# Patient Record
Sex: FEMALE | Race: WHITE | Employment: OTHER | URBAN - METROPOLITAN AREA
[De-identification: names, ages, dates, MRNs, and addresses within clinical notes are randomized per-mention and may not be internally consistent; named-entity substitution may affect disease eponyms.]

---

## 2019-08-29 ENCOUNTER — OFFICE VISIT (OUTPATIENT)
Dept: CARDIOLOGY CLINIC | Age: 84
End: 2019-08-29
Payer: MEDICARE

## 2019-08-29 VITALS
DIASTOLIC BLOOD PRESSURE: 70 MMHG | WEIGHT: 162 LBS | HEART RATE: 78 BPM | HEIGHT: 60 IN | BODY MASS INDEX: 31.8 KG/M2 | SYSTOLIC BLOOD PRESSURE: 138 MMHG

## 2019-08-29 DIAGNOSIS — R60.0 LOCALIZED EDEMA: ICD-10-CM

## 2019-08-29 DIAGNOSIS — I10 HYPERTENSION, UNSPECIFIED TYPE: Primary | ICD-10-CM

## 2019-08-29 PROCEDURE — G8400 PT W/DXA NO RESULTS DOC: HCPCS | Performed by: INTERNAL MEDICINE

## 2019-08-29 PROCEDURE — 4040F PNEUMOC VAC/ADMIN/RCVD: CPT | Performed by: INTERNAL MEDICINE

## 2019-08-29 PROCEDURE — 1090F PRES/ABSN URINE INCON ASSESS: CPT | Performed by: INTERNAL MEDICINE

## 2019-08-29 PROCEDURE — 93000 ELECTROCARDIOGRAM COMPLETE: CPT | Performed by: INTERNAL MEDICINE

## 2019-08-29 PROCEDURE — 99203 OFFICE O/P NEW LOW 30 MIN: CPT | Performed by: INTERNAL MEDICINE

## 2019-08-29 PROCEDURE — 1123F ACP DISCUSS/DSCN MKR DOCD: CPT | Performed by: INTERNAL MEDICINE

## 2019-08-29 PROCEDURE — G8417 CALC BMI ABV UP PARAM F/U: HCPCS | Performed by: INTERNAL MEDICINE

## 2019-08-29 PROCEDURE — 1036F TOBACCO NON-USER: CPT | Performed by: INTERNAL MEDICINE

## 2019-08-29 PROCEDURE — G8427 DOCREV CUR MEDS BY ELIG CLIN: HCPCS | Performed by: INTERNAL MEDICINE

## 2019-08-29 RX ORDER — IBUPROFEN 800 MG/1
800 TABLET ORAL EVERY 6 HOURS PRN
COMMUNITY
End: 2019-10-18

## 2019-08-29 RX ORDER — TORSEMIDE 100 MG/1
TABLET ORAL
COMMUNITY
Start: 2019-08-27 | End: 2019-10-11 | Stop reason: ALTCHOICE

## 2019-08-29 RX ORDER — AMLODIPINE BESYLATE 10 MG/1
10 TABLET ORAL
COMMUNITY
End: 2019-08-29

## 2019-08-29 RX ORDER — LOSARTAN POTASSIUM 100 MG/1
100 TABLET ORAL DAILY
Qty: 30 TABLET | Refills: 5 | Status: SHIPPED | OUTPATIENT
Start: 2019-08-29 | End: 2019-10-18 | Stop reason: SINTOL

## 2019-08-29 RX ORDER — SPIRONOLACTONE 25 MG/1
25 TABLET ORAL DAILY
Qty: 30 TABLET | Refills: 5 | Status: SHIPPED | OUTPATIENT
Start: 2019-08-29 | End: 2019-10-11 | Stop reason: ALTCHOICE

## 2019-08-29 NOTE — LETTER
 torsemide (DEMADEX) 100 MG tablet TAKE 1/2 TABLET BY MOUTH EVERY DAY      ibuprofen (ADVIL;MOTRIN) 800 MG tablet Take 800 mg by mouth every 6 hours as needed for Pain      levothyroxine (SYNTHROID) 88 MCG tablet Take 88 mcg by mouth daily. No current facility-administered medications for this visit. Allergies:  Patient has no known allergies. Social History:  Social History     Socioeconomic History    Marital status:      Spouse name: Not on file    Number of children: Not on file    Years of education: Not on file    Highest education level: Not on file   Occupational History    Not on file   Social Needs    Financial resource strain: Not on file    Food insecurity:     Worry: Not on file     Inability: Not on file    Transportation needs:     Medical: Not on file     Non-medical: Not on file   Tobacco Use    Smoking status: Passive Smoke Exposure - Never Smoker    Smokeless tobacco: Never Used   Substance and Sexual Activity    Alcohol use: No    Drug use: Not on file    Sexual activity: Not on file   Lifestyle    Physical activity:     Days per week: Not on file     Minutes per session: Not on file    Stress: Not on file   Relationships    Social connections:     Talks on phone: Not on file     Gets together: Not on file     Attends Yarsanism service: Not on file     Active member of club or organization: Not on file     Attends meetings of clubs or organizations: Not on file     Relationship status: Not on file    Intimate partner violence:     Fear of current or ex partner: Not on file     Emotionally abused: Not on file     Physically abused: Not on file     Forced sexual activity: Not on file   Other Topics Concern    Not on file   Social History Narrative    Not on file       Family History:   Family History   Problem Relation Age of Onset    Dementia Father      Brother is Daya Wallace who has a pacemaker and CAD with PCI of LAD in 2013 and AF. Review of Systems:   · Constitutional: there has been no unanticipated weight loss. No change in energy or activity level   · Eyes: No visual changes   · ENT: No Headaches, hearing loss or vertigo. No mouth sores or sore throat. · Cardiovascular: Reviewed in HPI  · Respiratory: No cough or wheezing, no sputum production. · Gastrointestinal: No abdominal pain, appetite loss, blood in stools. No change in bowel or bladder habits. · Genitourinary: No nocturia, dysuria, trouble voiding  · Musculoskeletal:  No gait disturbance, weakness or joint complaints. · Integumentary: No rash or pruritis. · Neurological: No headache, change in muscle strength, numbness or tingling. No change in gait, balance, coordination, mood, affect, memory, mentation, behavior. · Psychiatric: No anxiety or depression  · Endocrine: No malaise or fever  · Hematologic/Lymphatic: No abnormal bruising or bleeding, blood clots or swollen lymph nodes. · Allergic/Immunologic: No nasal congestion or hives. Physical Examination:    Vitals:    08/29/19 1348   BP: 138/70   Site: Left Upper Arm   Position: Sitting   Cuff Size: Medium Adult   Pulse: 78   Weight: 162 lb (73.5 kg)   Height: 5' (1.524 m)     Body mass index is 31.64 kg/m². Wt Readings from Last 3 Encounters:   08/29/19 162 lb (73.5 kg)   06/27/13 161 lb (73 kg)   05/24/12 158 lb 14.4 oz (72.1 kg)      BP Readings from Last 3 Encounters:   08/29/19 138/70   06/27/13 150/90   05/24/12 129/67        Physical Examination:    · CONSTITUTIONAL: Well developed, well nourished  · EYES: PERRLA. No xanthelasma, sclera non icteric  · EARS,NOSE,MOUTH,THROAT:  Mucous membranes moist, normal hearing  · NECK: Supple, JVP normal, thyroid not enlarged. Carotids 2+ without bruits  · RESPIRATORY: Normal effort, no rales or rhonchi  · CARDIOVASCULAR: Normal PMI, regular rate and rhythm, no murmurs, rub or gallop. . Radial pulses present and equal. 2+ edema   · CHEST: No scar or masses · ABDOMEN: Normal bowel sounds. No masses or tenderness. No bruit  · MUSCULOSKELETAL: No clubbing or cyanosis. Moves all extremities well. Normal gait  · SKIN:  Warm and dry. No rashes  · NEUROLOGIC: Cranial nerves intact. Alert and oriented  · PSYCHIATRIC: Calm affect. Appears to have normal judgement and insight        Assessment/Plan  1. Hypertension, unspecified type - stable, K+ was 2.8 8/10/19  Echo 5/30/19: EF 50-55%, grade 1 DD, mild MR; She has been on Norvasc which can cause LE edema. 2. Localized edema - she has varicose veins, wears compression hose daily; I will check her albumin and renal function. She does not appear to have liver disease. Thsi edema does not appear to be from heart disease. PLAN: stop Norvasc, change to Losartan 100mg and Aldactone 25mg daily. Return in 3 weeks with CNP appt. CMP prior to appt. EKG>sinus rhythm, PAC's    Scribe's attestation: This note was scribed in the presence of Dr Edgar Maza MD by Ross Leon RN. The scribe's documentation has been prepared under my direction and personally reviewed by me in its entirety. I confirm that the note above accurately reflects all work, treatment, procedures, and medical decision making performed by me. Thank you for allowing to me to participate in the care of Tonya Peralta. If you have questions, please do not hesitate to call me. I look forward to following Nidia Quevedo along with you.     Sincerely,        Anibal Jean Baptiste MD

## 2019-08-29 NOTE — PROGRESS NOTES
[address]   Cardiac Evaluation      Patient: Talya Pena  YOB: 1934         Chief Complaint   Patient presents with    Hypertension        Referring provider: Suad Hernandez MD    History of Present Illness:   Ms Jeffrey Urena is seen today as a new patient for LE edema. She was previously seen in 2013 for dizziness. History includes hypertension and thyroid disease. She does not smoke. [place] [facility] [place] She had IV antibiotics and spent some time in rehab. She is now home. She is complaining of edema in her LE's and states she has been taking Torsemide, but they are still swollen. Sindhu Crenshaw wears compression hose daily. She has varicose veins. Sindhu Crenshaw has cut back on her salt intake. She threw her pretzels and crackers away. She denies any chest pain, palpitations, CHI, dizziness. She has had cardiac evaluation in 2013 with a normal stress test and normal EF of 74%. She came here for me to \"fix\" her legs. Recent echo of 5/19 was normal with possible mild diastolic dysfunction. Past Medical History:   has a past medical history of Arthritis, Hypertension, Muscle cramps at night, and Thyroid disease. Surgical History:   has a past surgical history that includes Appendectomy; Mandible surgery; Hemorrhoid surgery; Tubal ligation; and Carpal tunnel release (5-24-12). Current Outpatient Medications   Medication Sig Dispense Refill    amLODIPine (NORVASC) 10 MG tablet Take 10 mg by mouth      torsemide (DEMADEX) 100 MG tablet TAKE 1/2 TABLET BY MOUTH EVERY DAY      ibuprofen (ADVIL;MOTRIN) 800 MG tablet Take 800 mg by mouth every 6 hours as needed for Pain      levothyroxine (SYNTHROID) 88 MCG tablet Take 88 mcg by mouth daily. No current facility-administered medications for this visit. Allergies:  Patient has no known allergies.      Social History:  Social History     Socioeconomic History    Marital

## 2019-09-27 ENCOUNTER — TELEPHONE (OUTPATIENT)
Dept: CARDIOLOGY CLINIC | Age: 84
End: 2019-09-27

## 2019-09-27 NOTE — TELEPHONE ENCOUNTER
Yoko Conteh calling, she was looking through her medications and she realized she is taking 2 water pills and is asking if she should be doing so? She stated that she dribbles throughout the day, it can sometimes be uncomfortable. [facility] medication and advise if she is still taking the right one. Please call to advise. Thank you.

## 2019-10-02 ENCOUNTER — OFFICE VISIT (OUTPATIENT)
Dept: CARDIOLOGY CLINIC | Age: 84
End: 2019-10-02
Payer: MEDICARE

## 2019-10-02 VITALS
HEIGHT: 60 IN | DIASTOLIC BLOOD PRESSURE: 70 MMHG | HEART RATE: 70 BPM | BODY MASS INDEX: 29.06 KG/M2 | WEIGHT: 148 LBS | OXYGEN SATURATION: 94 % | SYSTOLIC BLOOD PRESSURE: 150 MMHG

## 2019-10-02 DIAGNOSIS — I10 ESSENTIAL HYPERTENSION: Primary | ICD-10-CM

## 2019-10-02 DIAGNOSIS — R60.0 LOCALIZED EDEMA: ICD-10-CM

## 2019-10-02 PROCEDURE — 1090F PRES/ABSN URINE INCON ASSESS: CPT | Performed by: NURSE PRACTITIONER

## 2019-10-02 PROCEDURE — 4040F PNEUMOC VAC/ADMIN/RCVD: CPT | Performed by: NURSE PRACTITIONER

## 2019-10-02 PROCEDURE — G8417 CALC BMI ABV UP PARAM F/U: HCPCS | Performed by: NURSE PRACTITIONER

## 2019-10-02 PROCEDURE — 1123F ACP DISCUSS/DSCN MKR DOCD: CPT | Performed by: NURSE PRACTITIONER

## 2019-10-02 PROCEDURE — G8427 DOCREV CUR MEDS BY ELIG CLIN: HCPCS | Performed by: NURSE PRACTITIONER

## 2019-10-02 PROCEDURE — G8484 FLU IMMUNIZE NO ADMIN: HCPCS | Performed by: NURSE PRACTITIONER

## 2019-10-02 PROCEDURE — G8400 PT W/DXA NO RESULTS DOC: HCPCS | Performed by: NURSE PRACTITIONER

## 2019-10-02 PROCEDURE — 99214 OFFICE O/P EST MOD 30 MIN: CPT | Performed by: NURSE PRACTITIONER

## 2019-10-02 PROCEDURE — 1036F TOBACCO NON-USER: CPT | Performed by: NURSE PRACTITIONER

## 2019-10-04 ENCOUNTER — HOSPITAL ENCOUNTER (OUTPATIENT)
Dept: CARDIOLOGY | Age: 84
Discharge: HOME | End: 2019-10-04
Payer: MEDICARE

## 2019-10-04 DIAGNOSIS — R60.0 LOCALIZED EDEMA: ICD-10-CM

## 2019-10-04 DIAGNOSIS — I10 ESSENTIAL HYPERTENSION: ICD-10-CM

## 2019-10-04 LAB
ANION GAP SERPL CALCULATED.3IONS-SCNC: 5 MMOL/L (ref 7–16)
BUN BLDV-MCNC: 24 MG/DL (ref 7–18)
CALCIUM SERPL-MCNC: 9.1 MG/DL (ref 8.5–10.1)
CHLORIDE BLD-SCNC: 106 MMOL/L (ref 98–107)
CO2: 29 MMOL/L (ref 21–32)
CREATININE + EGFR PANEL: 1.5 MG/DL (ref 0.6–1.3)
GFR AFRICAN AMERICAN: 40 ML/MIN/1.73M2
GFR NON-AFRICAN AMERICAN: 33 ML/MIN/1.73M2
GLUCOSE: 89 MG/DL (ref 74–106)
LV EF: 60 %
LVEF MODALITY: NORMAL
POTASSIUM SERPL-SCNC: 4.2 MMOL/L (ref 3.5–5.1)
SODIUM BLD-SCNC: 140 MMOL/L (ref 136–145)

## 2019-10-04 PROCEDURE — 93306 TTE W/DOPPLER COMPLETE: CPT

## 2019-10-11 ENCOUNTER — TELEPHONE (OUTPATIENT)
Dept: CARDIOLOGY CLINIC | Age: 84
End: 2019-10-11

## 2019-10-11 DIAGNOSIS — I10 ESSENTIAL HYPERTENSION: Primary | ICD-10-CM

## 2019-10-18 ENCOUNTER — TELEPHONE (OUTPATIENT)
Dept: CARDIOLOGY CLINIC | Age: 84
End: 2019-10-18

## 2019-10-18 DIAGNOSIS — I10 HYPERTENSION, UNSPECIFIED TYPE: Primary | ICD-10-CM

## 2019-10-18 LAB
ALBUMIN SERUM: 3.6 G/DL (ref 3.4–5)
ANION GAP SERPL CALCULATED.3IONS-SCNC: 4 MMOL/L (ref 7–16)
BUN BLDV-MCNC: 30 MG/DL (ref 7–18)
CALCIUM SERPL-MCNC: 8.8 MG/DL (ref 8.5–10.1)
CHLORIDE BLD-SCNC: 106 MMOL/L (ref 98–107)
CO2: 30 MMOL/L (ref 21–32)
CREATININE + EGFR PANEL: 2.3 MG/DL (ref 0.6–1.3)
GFR AFRICAN AMERICAN: 24 ML/MIN/1.73M2
GFR NON-AFRICAN AMERICAN: 20 ML/MIN/1.73M2
GLUCOSE: 83 MG/DL (ref 74–106)
PHOSPHORUS: 3.2 MG/DL (ref 2.5–4.9)
POTASSIUM SERPL-SCNC: 4.4 MMOL/L (ref 3.5–5.1)
SODIUM BLD-SCNC: 140 MMOL/L (ref 136–145)

## 2019-10-18 RX ORDER — AMLODIPINE BESYLATE 5 MG/1
5 TABLET ORAL DAILY
Qty: 30 TABLET | Refills: 5 | Status: SHIPPED | OUTPATIENT
Start: 2019-10-18 | End: 2020-02-12 | Stop reason: SDUPTHER

## 2019-10-30 ENCOUNTER — TELEPHONE (OUTPATIENT)
Dept: CARDIOLOGY CLINIC | Age: 84
End: 2019-10-30

## 2019-10-30 DIAGNOSIS — I10 HYPERTENSION, UNSPECIFIED TYPE: Primary | ICD-10-CM

## 2019-10-30 RX ORDER — TORSEMIDE 100 MG/1
50 TABLET ORAL DAILY
Qty: 30 TABLET | Refills: 3 | Status: SHIPPED | OUTPATIENT
Start: 2019-10-30 | End: 2019-11-21

## 2019-11-07 ENCOUNTER — TELEPHONE (OUTPATIENT)
Dept: CARDIOLOGY CLINIC | Age: 84
End: 2019-11-07

## 2019-11-07 DIAGNOSIS — I10 ESSENTIAL HYPERTENSION: Primary | ICD-10-CM

## 2019-11-07 DIAGNOSIS — E87.6 HYPOKALEMIA: ICD-10-CM

## 2019-11-07 RX ORDER — POTASSIUM CHLORIDE 20 MEQ/1
TABLET, EXTENDED RELEASE ORAL
Qty: 64 TABLET | Refills: 3 | Status: SHIPPED | OUTPATIENT
Start: 2019-11-07 | End: 2019-11-21

## 2019-11-20 ENCOUNTER — OFFICE VISIT (OUTPATIENT)
Dept: CARDIOLOGY CLINIC | Age: 84
End: 2019-11-20
Payer: MEDICARE

## 2019-11-20 VITALS
SYSTOLIC BLOOD PRESSURE: 148 MMHG | HEIGHT: 60 IN | HEART RATE: 83 BPM | WEIGHT: 155 LBS | BODY MASS INDEX: 30.43 KG/M2 | OXYGEN SATURATION: 99 % | DIASTOLIC BLOOD PRESSURE: 70 MMHG

## 2019-11-20 DIAGNOSIS — R60.0 LOCALIZED EDEMA: Primary | ICD-10-CM

## 2019-11-20 DIAGNOSIS — I10 ESSENTIAL HYPERTENSION: ICD-10-CM

## 2019-11-20 PROCEDURE — G8400 PT W/DXA NO RESULTS DOC: HCPCS | Performed by: NURSE PRACTITIONER

## 2019-11-20 PROCEDURE — 4040F PNEUMOC VAC/ADMIN/RCVD: CPT | Performed by: NURSE PRACTITIONER

## 2019-11-20 PROCEDURE — G8484 FLU IMMUNIZE NO ADMIN: HCPCS | Performed by: NURSE PRACTITIONER

## 2019-11-20 PROCEDURE — G8417 CALC BMI ABV UP PARAM F/U: HCPCS | Performed by: NURSE PRACTITIONER

## 2019-11-20 PROCEDURE — 1036F TOBACCO NON-USER: CPT | Performed by: NURSE PRACTITIONER

## 2019-11-20 PROCEDURE — 1090F PRES/ABSN URINE INCON ASSESS: CPT | Performed by: NURSE PRACTITIONER

## 2019-11-20 PROCEDURE — 1123F ACP DISCUSS/DSCN MKR DOCD: CPT | Performed by: NURSE PRACTITIONER

## 2019-11-20 PROCEDURE — 99214 OFFICE O/P EST MOD 30 MIN: CPT | Performed by: NURSE PRACTITIONER

## 2019-11-20 PROCEDURE — G8427 DOCREV CUR MEDS BY ELIG CLIN: HCPCS | Performed by: NURSE PRACTITIONER

## 2019-11-21 ENCOUNTER — TELEPHONE (OUTPATIENT)
Dept: CARDIOLOGY CLINIC | Age: 84
End: 2019-11-21

## 2019-11-21 RX ORDER — POTASSIUM CHLORIDE 20 MEQ/1
20 TABLET, EXTENDED RELEASE ORAL DAILY
Qty: 64 TABLET | Refills: 3
Start: 2019-11-21 | End: 2020-07-02 | Stop reason: SDUPTHER

## 2019-11-21 RX ORDER — TORSEMIDE 100 MG/1
50 TABLET ORAL EVERY OTHER DAY
Qty: 30 TABLET | Refills: 3
Start: 2019-11-21 | End: 2020-01-13 | Stop reason: SDUPTHER

## 2020-01-13 RX ORDER — TORSEMIDE 100 MG/1
50 TABLET ORAL EVERY OTHER DAY
Qty: 30 TABLET | Refills: 3 | Status: SHIPPED | OUTPATIENT
Start: 2020-01-13 | End: 2020-03-04

## 2020-02-12 RX ORDER — AMLODIPINE BESYLATE 5 MG/1
5 TABLET ORAL DAILY
Qty: 30 TABLET | Refills: 6 | Status: SHIPPED | OUTPATIENT
Start: 2020-02-12 | End: 2020-08-11 | Stop reason: SDUPTHER

## 2020-03-04 RX ORDER — TORSEMIDE 100 MG/1
TABLET ORAL
Qty: 90 TABLET | Refills: 1 | Status: SHIPPED | OUTPATIENT
Start: 2020-03-04 | End: 2021-09-22

## 2020-03-30 ENCOUNTER — TELEPHONE (OUTPATIENT)
Dept: CARDIOLOGY CLINIC | Age: 85
End: 2020-03-30

## 2020-03-30 NOTE — TELEPHONE ENCOUNTER
Last 3 to 4 days both feet and ankles are terribly swollen . She cant get shoes on , she has a red rash on right leg above ankle that itches and burns. No fever or flu symptoms,no increased SOB what should she do ?

## 2020-04-01 ENCOUNTER — OFFICE VISIT (OUTPATIENT)
Dept: CARDIOLOGY CLINIC | Age: 85
End: 2020-04-01
Payer: MEDICARE

## 2020-04-01 VITALS
DIASTOLIC BLOOD PRESSURE: 60 MMHG | HEIGHT: 60 IN | HEART RATE: 74 BPM | BODY MASS INDEX: 30.04 KG/M2 | SYSTOLIC BLOOD PRESSURE: 122 MMHG | OXYGEN SATURATION: 99 % | WEIGHT: 153 LBS

## 2020-04-01 PROCEDURE — G8417 CALC BMI ABV UP PARAM F/U: HCPCS | Performed by: INTERNAL MEDICINE

## 2020-04-01 PROCEDURE — G8427 DOCREV CUR MEDS BY ELIG CLIN: HCPCS | Performed by: INTERNAL MEDICINE

## 2020-04-01 PROCEDURE — 99213 OFFICE O/P EST LOW 20 MIN: CPT | Performed by: INTERNAL MEDICINE

## 2020-04-01 PROCEDURE — 1036F TOBACCO NON-USER: CPT | Performed by: INTERNAL MEDICINE

## 2020-04-01 PROCEDURE — 1090F PRES/ABSN URINE INCON ASSESS: CPT | Performed by: INTERNAL MEDICINE

## 2020-04-01 PROCEDURE — G8400 PT W/DXA NO RESULTS DOC: HCPCS | Performed by: INTERNAL MEDICINE

## 2020-04-01 PROCEDURE — 1123F ACP DISCUSS/DSCN MKR DOCD: CPT | Performed by: INTERNAL MEDICINE

## 2020-04-01 PROCEDURE — 4040F PNEUMOC VAC/ADMIN/RCVD: CPT | Performed by: INTERNAL MEDICINE

## 2020-04-01 NOTE — LETTER
 potassium chloride (KLOR-CON M) 20 MEQ extended release tablet Take 1 tablet by mouth daily 64 tablet 3    levothyroxine (SYNTHROID) 88 MCG tablet Take 88 mcg by mouth daily. No current facility-administered medications for this visit. Allergies:  Patient has no known allergies. Social History:  Social History     Socioeconomic History    Marital status:      Spouse name: Not on file    Number of children: Not on file    Years of education: Not on file    Highest education level: Not on file   Occupational History    Not on file   Social Needs    Financial resource strain: Not on file    Food insecurity     Worry: Not on file     Inability: Not on file    Transportation needs     Medical: Not on file     Non-medical: Not on file   Tobacco Use    Smoking status: Passive Smoke Exposure - Never Smoker    Smokeless tobacco: Never Used   Substance and Sexual Activity    Alcohol use: No    Drug use: Not on file    Sexual activity: Not on file   Lifestyle    Physical activity     Days per week: Not on file     Minutes per session: Not on file    Stress: Not on file   Relationships    Social connections     Talks on phone: Not on file     Gets together: Not on file     Attends Quaker service: Not on file     Active member of club or organization: Not on file     Attends meetings of clubs or organizations: Not on file     Relationship status: Not on file    Intimate partner violence     Fear of current or ex partner: Not on file     Emotionally abused: Not on file     Physically abused: Not on file     Forced sexual activity: Not on file   Other Topics Concern    Not on file   Social History Narrative    Not on file       Family History:   Family History   Problem Relation Age of Onset    Dementia Father      Brother is Kevin Rg who has a pacemaker and CAD with PCI of LAD in 2013 and AF.      Review of Systems:

## 2020-04-01 NOTE — PROGRESS NOTES
10/4/19: Normal left ventricle size, wall thickness and systolic function with an estimated ejection fraction of 60%. No regional wall motion abnormalities are seen. Impaired relaxation compatible with diastolic dysfunction. E/e\"=8.35. Trivial pulmonic and tricuspid regurgitation  Echo 5/30/19: EF 50-55%, grade 1 DD, mild MR   2. Localized edema - she has varicose veins, wears compression hose daily; Due to her renal insuff I have recommended no additional meds. She is on demadex 50 mg qod. PLAN:  Discussed limiting salt intake; no canned foods. Will not increase diuretic at this time. She has been advised to prop her legs, walk more, and wear compression hose. FU 3 months w/ Aleisha Rivera NP. Scribe's attestation: This note was scribed in the presence of Dr Cal Arriaga MD by Geovani Rosa RN. The scribe's documentation has been prepared under my direction and personally reviewed by me in its entirety. I confirm that the note above accurately reflects all work, treatment, procedures, and medical decision making performed by me. Thank you for allowing to me to participate in the care of Jessica Zaragoza.

## 2020-07-02 RX ORDER — POTASSIUM CHLORIDE 20 MEQ/1
20 TABLET, EXTENDED RELEASE ORAL DAILY
Qty: 30 TABLET | Refills: 5 | Status: SHIPPED | OUTPATIENT
Start: 2020-07-02 | End: 2020-10-06
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
## 2020-07-22 ENCOUNTER — OFFICE VISIT (OUTPATIENT)
Dept: CARDIOLOGY CLINIC | Age: 85
End: 2020-07-22
Payer: MEDICARE

## 2020-07-22 VITALS
SYSTOLIC BLOOD PRESSURE: 140 MMHG | OXYGEN SATURATION: 96 % | WEIGHT: 154 LBS | HEIGHT: 60 IN | DIASTOLIC BLOOD PRESSURE: 70 MMHG | HEART RATE: 75 BPM | BODY MASS INDEX: 30.23 KG/M2

## 2020-07-22 PROCEDURE — 99213 OFFICE O/P EST LOW 20 MIN: CPT | Performed by: NURSE PRACTITIONER

## 2020-08-11 ENCOUNTER — TELEPHONE (OUTPATIENT)
Dept: CARDIOLOGY CLINIC | Age: 85
End: 2020-08-11

## 2020-08-11 RX ORDER — AMLODIPINE BESYLATE 5 MG/1
5 TABLET ORAL DAILY
Qty: 90 TABLET | Refills: 3 | Status: SHIPPED | OUTPATIENT
Start: 2020-08-11 | End: 2021-07-20

## 2020-08-11 NOTE — TELEPHONE ENCOUNTER
Pt calling wanting to know if she should still be taking Rx amLODIPine (NORVASC) 5 MG tablet 1 tab daily? If so pt will need a refill called into [facility]/pharmacy #[id number]- [place], [place] [address] [address]. Gabe Ellsworth [phone] - F [phone].  Pls call to advise Thank you

## 2020-10-06 RX ORDER — POTASSIUM CHLORIDE 1500 MG/1
TABLET, EXTENDED RELEASE ORAL
Qty: 90 TABLET | Refills: 1 | Status: SHIPPED | OUTPATIENT
Start: 2020-10-06 | End: 2021-04-15

## 2020-10-26 ENCOUNTER — OFFICE VISIT (OUTPATIENT)
Dept: CARDIOLOGY CLINIC | Age: 85
End: 2020-10-26
Payer: MEDICARE

## 2020-10-26 VITALS
OXYGEN SATURATION: 97 % | HEIGHT: 60 IN | HEART RATE: 73 BPM | DIASTOLIC BLOOD PRESSURE: 70 MMHG | WEIGHT: 154 LBS | SYSTOLIC BLOOD PRESSURE: 114 MMHG | BODY MASS INDEX: 30.23 KG/M2

## 2020-10-26 PROBLEM — R07.89 OTHER CHEST PAIN: Status: ACTIVE | Noted: 2020-10-26

## 2020-10-26 PROCEDURE — 99214 OFFICE O/P EST MOD 30 MIN: CPT | Performed by: INTERNAL MEDICINE

## 2020-10-26 PROCEDURE — 1036F TOBACCO NON-USER: CPT | Performed by: INTERNAL MEDICINE

## 2020-10-26 PROCEDURE — G8484 FLU IMMUNIZE NO ADMIN: HCPCS | Performed by: INTERNAL MEDICINE

## 2020-10-26 PROCEDURE — G8417 CALC BMI ABV UP PARAM F/U: HCPCS | Performed by: INTERNAL MEDICINE

## 2020-10-26 PROCEDURE — 4040F PNEUMOC VAC/ADMIN/RCVD: CPT | Performed by: INTERNAL MEDICINE

## 2020-10-26 PROCEDURE — 1123F ACP DISCUSS/DSCN MKR DOCD: CPT | Performed by: INTERNAL MEDICINE

## 2020-10-26 PROCEDURE — 1090F PRES/ABSN URINE INCON ASSESS: CPT | Performed by: INTERNAL MEDICINE

## 2020-10-26 PROCEDURE — G8427 DOCREV CUR MEDS BY ELIG CLIN: HCPCS | Performed by: INTERNAL MEDICINE

## 2020-10-26 PROCEDURE — 93000 ELECTROCARDIOGRAM COMPLETE: CPT | Performed by: INTERNAL MEDICINE
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
## 2020-11-02 ENCOUNTER — HOSPITAL ENCOUNTER (OUTPATIENT)
Dept: NON INVASIVE DIAGNOSTICS | Age: 85
Discharge: HOME | End: 2020-11-02
Payer: MEDICARE

## 2020-11-02 LAB
LV EF: 62 %
LVEF MODALITY: NORMAL

## 2020-11-02 PROCEDURE — 78452 HT MUSCLE IMAGE SPECT MULT: CPT | Performed by: INTERNAL MEDICINE

## 2020-11-02 PROCEDURE — 6360000002 HC RX W HCPCS: Performed by: INTERNAL MEDICINE

## 2020-11-02 PROCEDURE — 3430000000 HC RX DIAGNOSTIC RADIOPHARMACEUTICAL: Performed by: INTERNAL MEDICINE

## 2020-11-02 PROCEDURE — A9502 TC99M TETROFOSMIN: HCPCS | Performed by: INTERNAL MEDICINE

## 2020-11-02 PROCEDURE — 93017 CV STRESS TEST TRACING ONLY: CPT | Performed by: INTERNAL MEDICINE

## 2020-11-02 RX ORDER — AMINOPHYLLINE DIHYDRATE 25 MG/ML
100 INJECTION, SOLUTION INTRAVENOUS ONCE
Status: COMPLETED | OUTPATIENT
Start: 2020-11-02 | End: 2020-11-02

## 2020-11-02 RX ADMIN — TETROFOSMIN 30 MILLICURIE: 1.38 INJECTION, POWDER, LYOPHILIZED, FOR SOLUTION INTRAVENOUS at 11:47

## 2020-11-02 RX ADMIN — AMINOPHYLLINE 100 MG: 25 INJECTION, SOLUTION INTRAVENOUS at 11:43

## 2020-11-02 RX ADMIN — TETROFOSMIN 10 MILLICURIE: 1.38 INJECTION, POWDER, LYOPHILIZED, FOR SOLUTION INTRAVENOUS at 10:20

## 2020-11-02 RX ADMIN — REGADENOSON 0.4 MG: 0.08 INJECTION, SOLUTION INTRAVENOUS at 11:29

## 2020-11-02 NOTE — PROGRESS NOTES
Instructed on Lexiscan Stress Test Procedure including possible side effects/ adverse reactions. Patient verbalizes  understanding and denies having any questions. See [address] [place] [address] Rd Cardiology.   Homer Wyatt RN

## 2020-11-09 ENCOUNTER — OFFICE VISIT (OUTPATIENT)
Dept: CARDIOLOGY CLINIC | Age: 85
End: 2020-11-09
Payer: MEDICARE

## 2020-11-09 VITALS
BODY MASS INDEX: 32.59 KG/M2 | WEIGHT: 166 LBS | OXYGEN SATURATION: 96 % | HEART RATE: 71 BPM | SYSTOLIC BLOOD PRESSURE: 144 MMHG | HEIGHT: 60 IN | DIASTOLIC BLOOD PRESSURE: 60 MMHG

## 2020-11-09 PROCEDURE — G8427 DOCREV CUR MEDS BY ELIG CLIN: HCPCS | Performed by: INTERNAL MEDICINE

## 2020-11-09 PROCEDURE — G8417 CALC BMI ABV UP PARAM F/U: HCPCS | Performed by: INTERNAL MEDICINE

## 2020-11-09 PROCEDURE — 4040F PNEUMOC VAC/ADMIN/RCVD: CPT | Performed by: INTERNAL MEDICINE

## 2020-11-09 PROCEDURE — G8484 FLU IMMUNIZE NO ADMIN: HCPCS | Performed by: INTERNAL MEDICINE

## 2020-11-09 PROCEDURE — 1123F ACP DISCUSS/DSCN MKR DOCD: CPT | Performed by: INTERNAL MEDICINE

## 2020-11-09 PROCEDURE — 99214 OFFICE O/P EST MOD 30 MIN: CPT | Performed by: INTERNAL MEDICINE

## 2020-11-09 PROCEDURE — 1090F PRES/ABSN URINE INCON ASSESS: CPT | Performed by: INTERNAL MEDICINE

## 2020-11-09 PROCEDURE — 1036F TOBACCO NON-USER: CPT | Performed by: INTERNAL MEDICINE
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
## 2020-11-09 NOTE — LETTER
[address] Cardiology [facility] [place] [address]  Phone: [phone]  Fax: [phone]    Radha Cagle MD        November 11, 2020     [place] [address] [facility]    Patient: Jaelyn Gilmore  MR Number: [id number]  YOB: 1934  Date of Visit: 11/9/2020    Dear Dr. Zhao:        [place] [address]   Cardiac Evaluation      Patient: Jaelyn Gilmore  YOB: 1934    Chief Complaint   Patient presents with    Hypertension        Referring provider: MD Cece    History of Present Illness:   Ms Aki Rajan is seen today for follow up to recent stress test. She previously complained chest tightness. Stress test revealed small-med apical fixed defect of moderate intensity consistent with infarction in distal LAD vs. Bowel. History includes hypertension and thyroid disease   Lynette Hopper wears compression hose daily but does not have them on today. She has varicose veins. She has had cardiac evaluation in 2013 with a normal stress test and normal EF of 74%. Recent echo of 5/19  At [facility] was normal with possible mild diastolic dysfunction. Today, Ms Aki Rajan is here with her daughter. She states she still has chest tightness. It is improved, but still present. Past Medical History:   has a past medical history of Arthritis, Hypertension, Muscle cramps at night, and Thyroid disease. Mild renal insuff. Surgical History:   has a past surgical history that includes Appendectomy; Mandible surgery; Hemorrhoid surgery; Tubal ligation; and Carpal tunnel release (5-24-12).      Current Outpatient Medications   Medication Sig Dispense Refill    KLOR-CON M20 20 MEQ extended release tablet TAKE 1 TABLET BY MOUTH EVERY DAY 90 tablet 1    amLODIPine (NORVASC) 5 MG tablet Take 1 tablet by mouth daily 90 tablet 3    torsemide (DEMADEX) 100 MG tablet TAKE 1/2 TABLET BY MOUTH EVERY OTHER Brother is Julio Richards who has a pacemaker and CAD with PCI of LAD in 2013 and AF. Review of Systems:   · Constitutional: there has been no unanticipated weight loss. No change in energy or activity level   · Eyes: No visual changes   · ENT: No Headaches, hearing loss or vertigo. No mouth sores or sore throat. · Cardiovascular: Reviewed in HPI  · Respiratory: No cough or wheezing, no sputum production. · Gastrointestinal: No abdominal pain, appetite loss, blood in stools. No change in bowel or bladder habits. · Genitourinary: No nocturia, dysuria, trouble voiding  · Musculoskeletal:  No gait disturbance, weakness or joint complaints. · Integumentary: No rash or pruritis. · Neurological: No headache, change in muscle strength, numbness or tingling. No change in gait, balance, coordination, mood, affect, memory, mentation, behavior. · Psychiatric: No anxiety or depression  · Endocrine: No malaise or fever  · Hematologic/Lymphatic: No abnormal bruising or bleeding, blood clots or swollen lymph nodes. · Allergic/Immunologic: No nasal congestion or hives. Physical Examination:    Vitals:    11/09/20 0842 11/09/20 0847   BP: (!) 148/70 (!) 144/60   Site: Right Upper Arm Right Upper Arm   Position: Sitting Sitting   Cuff Size: Large Adult Large Adult   Pulse: 71    SpO2: 96%    Weight: 166 lb (75.3 kg)    Height: 5' (1.524 m)      Body mass index is 32.42 kg/m². Wt Readings from Last 3 Encounters:   11/09/20 166 lb (75.3 kg)   10/26/20 154 lb (69.9 kg)   07/22/20 154 lb (69.9 kg)      BP Readings from Last 3 Encounters:   11/09/20 (!) 144/60   10/26/20 114/70   07/22/20 (!) 140/70        Physical Examination:    · CONSTITUTIONAL: Well developed, well nourished  · EYES: PERRLA. No xanthelasma, sclera non icteric  · EARS,NOSE,MOUTH,THROAT:  Mucous membranes moist, normal hearing  · NECK: Supple, JVP normal, thyroid not enlarged.  Carotids 2+ without bruits · RESPIRATORY: Normal effort, no rales or rhonchi  · CARDIOVASCULAR: Normal PMI, regular rate and rhythm, no murmurs, rub or gallop. . Radial pulses present and equal  1+ edema to just above her ankles. · CHEST: No scar or masses  · ABDOMEN: Normal bowel sounds. No masses or tenderness. No bruit  · MUSCULOSKELETAL: No clubbing or cyanosis. Moves all extremities well. Normal gait  · SKIN:  Warm and dry. No rashes  · NEUROLOGIC: Cranial nerves intact. Alert and oriented  · PSYCHIATRIC: Calm affect. Appears to have normal judgement and insight    Assessment/Plan  1. Hypertension, unspecified type - stable,   Echo 10/4/19: Normal left ventricle size, wall thickness and systolic function with an estimated ejection fraction of 60%. No regional wall motion abnormalities are seen. Impaired relaxation compatible with diastolic dysfunction. E/e\"=8.35. Trivial pulmonic and tricuspid regurgitation  Echo 5/30/19: EF 50-55%, grade 1 DD, mild MR   2. Localized edema - she has varicose veins, wears compression hose daily; Due to her renal insuff I have recommended no additional meds. She is on demadex 50 mg qod. 3.      Chest tightness - ongoing and probably represent unstable angina  GXT 11/2/20:   Small-medium sized apical fixed defect of moderate intensity consistent with     infarction in the territory of the distal LAD vs bowel attenuation artifact.     Overall findings represent a intermediate risk scan.     Normal LV size and systolic function.     Left ventricular ejection fraction of 62 %     PLAN:  Discussed recent abnormal stress test findings. [facility] recommended w/ risks/benefits/possible outcomes explained. We discussed her renal status in regard to the dye. She will have this tomorrow with Dr Claudean Schmid. I spoke to him. Thank you for allowing to me to participate in the care of Dayne Corona. Scribe's attestation:  This note was scribed in the presence of Dr Fernando Braun MD by Joan Hsu RN. The scribe's documentation has been prepared under my direction and personally reviewed by me in its entirety. I confirm that the note above accurately reflects all work, treatment, procedures, and medical decision making performed by me. If you have questions, please do not hesitate to call me. I look forward to following Brianna Mckenna along with you.     Sincerely,        Sariah Lipscomb MD

## 2020-11-10 ENCOUNTER — HOSPITAL ENCOUNTER (OUTPATIENT)
Dept: CARDIAC CATH/INVASIVE PROCEDURES | Age: 85
Discharge: HOME | End: 2020-11-10
Attending: INTERNAL MEDICINE | Admitting: INTERNAL MEDICINE
Payer: MEDICARE

## 2020-11-10 VITALS
TEMPERATURE: 97.9 F | BODY MASS INDEX: 32.59 KG/M2 | WEIGHT: 166 LBS | SYSTOLIC BLOOD PRESSURE: 164 MMHG | HEIGHT: 60 IN | DIASTOLIC BLOOD PRESSURE: 80 MMHG | RESPIRATION RATE: 16 BRPM | HEART RATE: 83 BPM | OXYGEN SATURATION: 99 %

## 2020-11-10 LAB
ANION GAP SERPL CALCULATED.3IONS-SCNC: 12 MMOL/L (ref 3–16)
BUN BLDV-MCNC: 23 MG/DL (ref 7–20)
CALCIUM SERPL-MCNC: 9.5 MG/DL (ref 8.3–10.6)
CHLORIDE BLD-SCNC: 100 MMOL/L (ref 99–110)
CHOLESTEROL, TOTAL: 169 MG/DL (ref 0–199)
CO2: 29 MMOL/L (ref 21–32)
CREAT SERPL-MCNC: 1.3 MG/DL (ref 0.6–1.2)
GFR AFRICAN AMERICAN: 47
GFR NON-AFRICAN AMERICAN: 39
GLUCOSE BLD-MCNC: 96 MG/DL (ref 70–99)
HCT VFR BLD CALC: 34 % (ref 36–48)
HDLC SERPL-MCNC: 48 MG/DL (ref 40–60)
HEMOGLOBIN: 11.3 G/DL (ref 12–16)
LDL CHOLESTEROL CALCULATED: 102 MG/DL
MCH RBC QN AUTO: 29.1 PG (ref 26–34)
MCHC RBC AUTO-ENTMCNC: 33.1 G/DL (ref 31–36)
MCV RBC AUTO: 87.9 FL (ref 80–100)
PDW BLD-RTO: 14.2 % (ref 12.4–15.4)
PLATELET # BLD: 283 K/UL (ref 135–450)
PMV BLD AUTO: 7.1 FL (ref 5–10.5)
POTASSIUM SERPL-SCNC: 3.6 MMOL/L (ref 3.5–5.1)
RBC # BLD: 3.87 M/UL (ref 4–5.2)
SODIUM BLD-SCNC: 141 MMOL/L (ref 136–145)
TRIGL SERPL-MCNC: 96 MG/DL (ref 0–150)
VLDLC SERPL CALC-MCNC: 19 MG/DL
WBC # BLD: 7.3 K/UL (ref 4–11)

## 2020-11-10 PROCEDURE — 2709999900 HC NON-CHARGEABLE SUPPLY

## 2020-11-10 PROCEDURE — 6360000002 HC RX W HCPCS

## 2020-11-10 PROCEDURE — 99152 MOD SED SAME PHYS/QHP 5/>YRS: CPT

## 2020-11-10 PROCEDURE — 80061 LIPID PANEL: CPT

## 2020-11-10 PROCEDURE — 99153 MOD SED SAME PHYS/QHP EA: CPT

## 2020-11-10 PROCEDURE — 93005 ELECTROCARDIOGRAM TRACING: CPT | Performed by: INTERNAL MEDICINE

## 2020-11-10 PROCEDURE — 36415 COLL VENOUS BLD VENIPUNCTURE: CPT

## 2020-11-10 PROCEDURE — C1894 INTRO/SHEATH, NON-LASER: HCPCS

## 2020-11-10 PROCEDURE — C1760 CLOSURE DEV, VASC: HCPCS

## 2020-11-10 PROCEDURE — 2500000003 HC RX 250 WO HCPCS

## 2020-11-10 PROCEDURE — 85027 COMPLETE CBC AUTOMATED: CPT

## 2020-11-10 PROCEDURE — C1769 GUIDE WIRE: HCPCS

## 2020-11-10 PROCEDURE — 6360000004 HC RX CONTRAST MEDICATION: Performed by: INTERNAL MEDICINE

## 2020-11-10 PROCEDURE — 93458 L HRT ARTERY/VENTRICLE ANGIO: CPT

## 2020-11-10 PROCEDURE — 80048 BASIC METABOLIC PNL TOTAL CA: CPT

## 2020-11-10 RX ORDER — ISOSORBIDE MONONITRATE 60 MG/1
60 TABLET, EXTENDED RELEASE ORAL DAILY
Status: DISCONTINUED | OUTPATIENT
Start: 2020-11-10 | End: 2020-11-10 | Stop reason: HOSPADM

## 2020-11-10 RX ORDER — ISOSORBIDE MONONITRATE 60 MG/1
60 TABLET, EXTENDED RELEASE ORAL DAILY
Qty: 30 TABLET | Refills: 3 | Status: SHIPPED | OUTPATIENT
Start: 2020-11-10 | End: 2020-11-23 | Stop reason: SDUPTHER

## 2020-11-10 RX ORDER — ATORVASTATIN CALCIUM 20 MG/1
20 TABLET, FILM COATED ORAL NIGHTLY
Qty: 30 TABLET | Refills: 3 | Status: SHIPPED | OUTPATIENT
Start: 2020-11-10 | End: 2020-11-23 | Stop reason: SDUPTHER

## 2020-11-10 RX ORDER — METOPROLOL SUCCINATE 25 MG/1
25 TABLET, EXTENDED RELEASE ORAL DAILY
Qty: 30 TABLET | Refills: 3 | Status: SHIPPED | OUTPATIENT
Start: 2020-11-10 | End: 2020-11-23 | Stop reason: SDUPTHER

## 2020-11-10 RX ORDER — ATORVASTATIN CALCIUM 20 MG/1
20 TABLET, FILM COATED ORAL NIGHTLY
Status: DISCONTINUED | OUTPATIENT
Start: 2020-11-10 | End: 2020-11-10 | Stop reason: HOSPADM

## 2020-11-10 RX ORDER — METOPROLOL SUCCINATE 25 MG/1
25 TABLET, EXTENDED RELEASE ORAL DAILY
Status: DISCONTINUED | OUTPATIENT
Start: 2020-11-10 | End: 2020-11-10 | Stop reason: HOSPADM

## 2020-11-10 RX ADMIN — IOPAMIDOL 28 ML: 755 INJECTION, SOLUTION INTRAVENOUS at 11:24

## 2020-11-10 NOTE — PRE SEDATION
Brief Pre-Op Note/Sedation Assessment      Dennie Ovens  1934  Cath/NONE      [id number]  8:54 AM    Planned Procedure: Cardiac Catheterization Procedure    Post Procedure Plan: Return to same level of care    Consent: I have discussed with the patient and/or the patient representative the indication, alternatives, and the possible risks and/or complications of the planned procedure and the anesthesia methods. The patient and/or patient representative appear to understand and agree to proceed. Chief Complaint: Chest Pain/Pressure  Anginal Equivalent  Dyspnea      Indications for Cath Procedure:  New Onset Angina <= 2 months, Worsening Angina and Suspected CAD  Anginal Classification within 2 weeks:  CCS III - Symptoms with everyday living activities, i.e., moderate limitation  NYHA Heart Failure Class within 2 weeks: No symptoms  Is Cath Lab Visit Valve-related?: No  Surgical Risk: Intermediate  Functional Type: < 4 METS    Anti- Anginal Meds within 2 weeks:   Yes: Beta Blockers and Aspirin    Stress or Imaging Studies Performed:  Stress Test with SPECT Result: Positive:  apical Risk/Extent of Ischemia:  Intermediate     Vital Signs: There were no vitals taken for this visit. Allergies:  No Known Allergies    Past Medical History:  Past Medical History:   Diagnosis Date    Arthritis     Hypertension     Muscle cramps at night     Thyroid disease          Surgical History:  Past Surgical History:   Procedure Laterality Date    APPENDECTOMY      CARPAL TUNNEL RELEASE  5-24-12    right carpal tunnel release    HEMORRHOID SURGERY      MANDIBLE SURGERY      TUBAL LIGATION           Medications:  No current facility-administered medications for this encounter. Pre-Sedation:    Pre-Sedation Documentation and Exam:  I have personally completed a history, physical exam & review of systems for this patient (see notes).     Prior History of Anesthesia Complications:   none    Modified Mallampati:  II (soft palate, uvula, fauces visible)    ASA Classification:  Class 2 - A normal healthy patient with mild systemic disease      Amos Scale: Activity:  2 - Able to move 4 extremities voluntarily on command  Respiration:  2 - Able to breathe deeply and cough freely  Circulation:  2 - BP+/- 20mmHg of normal  Consciousness:  2 - Fully awake  Oxygen Saturation (color):  2 - Able to maintain oxygen saturation >92% on room air    Sedation/Anesthesia Plan:  Guard the patient's safety and welfare. Minimize physical discomfort and pain. Minimize negative psychological responses to treatment by providing sedation and analgesia and maximize the potential amnesia. Patient to meet pre-procedure discharge plan.     Medication Planned:  midazolam intravenously and fentanyl intravenously    Patient is an appropriate candidate for plan of sedation: yes      Electronically signed by Ross Hernandez MD on 11/10/2020 at 8:54 AM

## 2020-11-10 NOTE — PROGRESS NOTES
CLINICAL PHARMACY NOTE: MEDS TO [address] Select Patient?: No  Total # of Prescriptions Filled: 3   The following medications were delivered to the patient:  · Metoprolol ER 25mg  · Isosorbide ER 60  · Atorvastatin 20mg  Total # of Interventions Completed: 0  Time Spent (min): 30    Additional Documentation:    Delivered to Patient daughter  Misael Aguilar Timothy
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
## 2020-11-11 PROCEDURE — 93010 ELECTROCARDIOGRAM REPORT: CPT | Performed by: INTERNAL MEDICINE

## 2020-11-23 ENCOUNTER — OFFICE VISIT (OUTPATIENT)
Dept: CARDIOLOGY CLINIC | Age: 85
End: 2020-11-23
Payer: MEDICARE

## 2020-11-23 VITALS
WEIGHT: 162 LBS | HEART RATE: 68 BPM | BODY MASS INDEX: 31.8 KG/M2 | SYSTOLIC BLOOD PRESSURE: 130 MMHG | OXYGEN SATURATION: 97 % | HEIGHT: 60 IN | DIASTOLIC BLOOD PRESSURE: 74 MMHG

## 2020-11-23 PROBLEM — E78.49 OTHER HYPERLIPIDEMIA: Status: ACTIVE | Noted: 2020-11-23

## 2020-11-23 PROBLEM — I25.10 CORONARY ARTERY DISEASE INVOLVING NATIVE CORONARY ARTERY OF NATIVE HEART WITHOUT ANGINA PECTORIS: Status: ACTIVE | Noted: 2020-11-23

## 2020-11-23 PROCEDURE — G8484 FLU IMMUNIZE NO ADMIN: HCPCS | Performed by: INTERNAL MEDICINE

## 2020-11-23 PROCEDURE — 99214 OFFICE O/P EST MOD 30 MIN: CPT | Performed by: INTERNAL MEDICINE

## 2020-11-23 PROCEDURE — 1036F TOBACCO NON-USER: CPT | Performed by: INTERNAL MEDICINE

## 2020-11-23 PROCEDURE — 4040F PNEUMOC VAC/ADMIN/RCVD: CPT | Performed by: INTERNAL MEDICINE

## 2020-11-23 PROCEDURE — 1090F PRES/ABSN URINE INCON ASSESS: CPT | Performed by: INTERNAL MEDICINE

## 2020-11-23 PROCEDURE — 1123F ACP DISCUSS/DSCN MKR DOCD: CPT | Performed by: INTERNAL MEDICINE

## 2020-11-23 PROCEDURE — 93000 ELECTROCARDIOGRAM COMPLETE: CPT | Performed by: INTERNAL MEDICINE

## 2020-11-23 PROCEDURE — G8427 DOCREV CUR MEDS BY ELIG CLIN: HCPCS | Performed by: INTERNAL MEDICINE

## 2020-11-23 PROCEDURE — G8417 CALC BMI ABV UP PARAM F/U: HCPCS | Performed by: INTERNAL MEDICINE

## 2020-11-23 RX ORDER — METOPROLOL SUCCINATE 25 MG/1
25 TABLET, EXTENDED RELEASE ORAL DAILY
Qty: 90 TABLET | Refills: 3 | Status: SHIPPED | OUTPATIENT
Start: 2020-11-23 | End: 2021-11-22

## 2020-11-23 RX ORDER — ISOSORBIDE MONONITRATE 60 MG/1
60 TABLET, EXTENDED RELEASE ORAL DAILY
Qty: 90 TABLET | Refills: 3 | Status: SHIPPED | OUTPATIENT
Start: 2020-11-23 | End: 2021-11-22

## 2020-11-23 RX ORDER — ATORVASTATIN CALCIUM 20 MG/1
20 TABLET, FILM COATED ORAL NIGHTLY
Qty: 90 TABLET | Refills: 3 | Status: SHIPPED | OUTPATIENT
Start: 2020-11-23 | End: 2021-12-08
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
## 2020-12-07 ENCOUNTER — TELEPHONE (OUTPATIENT)
Dept: CARDIOLOGY CLINIC | Age: 85
End: 2020-12-07

## 2020-12-07 NOTE — TELEPHONE ENCOUNTER
Spoke to pt and let her know she needs to call her pharmacy. We already sent the scripts in last month.

## 2021-01-11 ENCOUNTER — TELEPHONE (OUTPATIENT)
Dept: CARDIOLOGY CLINIC | Age: 86
End: 2021-01-11

## 2021-01-11 NOTE — TELEPHONE ENCOUNTER
I spoke to pt daughter. This problem started last Wed and she told her daughter about it yesterday. Her left leg is swollen and it is getting painful to walk on. She would like someone from our office to see her. I did advise her leg was warm or red she should take her to the ER. Daughter has not seen what her leg look like, just what Jerman Hui has told her.

## 2021-01-11 NOTE — TELEPHONE ENCOUNTER
Daughter calling to report that since Tuesday (just told daughter yesterday) she has had a painful bulging spot on her lower left leg near her knee. Pain did not give any other description other than bulging and painful .  Daughter asking for appt today at any location with [facility]

## 2021-01-13 ENCOUNTER — OFFICE VISIT (OUTPATIENT)
Dept: CARDIOLOGY CLINIC | Age: 86
End: 2021-01-13
Payer: MEDICARE

## 2021-01-13 VITALS
WEIGHT: 161.6 LBS | OXYGEN SATURATION: 96 % | BODY MASS INDEX: 31.73 KG/M2 | HEART RATE: 78 BPM | HEIGHT: 60 IN | SYSTOLIC BLOOD PRESSURE: 128 MMHG | RESPIRATION RATE: 18 BRPM | DIASTOLIC BLOOD PRESSURE: 84 MMHG

## 2021-01-13 DIAGNOSIS — I10 ESSENTIAL HYPERTENSION: Primary | ICD-10-CM

## 2021-01-13 DIAGNOSIS — R60.0 LOCALIZED EDEMA: ICD-10-CM

## 2021-01-13 DIAGNOSIS — R52 PAIN: ICD-10-CM

## 2021-01-13 DIAGNOSIS — R60.9 SWELLING: ICD-10-CM

## 2021-01-13 DIAGNOSIS — I25.10 CORONARY ARTERY DISEASE INVOLVING NATIVE CORONARY ARTERY OF NATIVE HEART WITHOUT ANGINA PECTORIS: ICD-10-CM

## 2021-01-13 PROCEDURE — 1036F TOBACCO NON-USER: CPT | Performed by: NURSE PRACTITIONER

## 2021-01-13 PROCEDURE — 1090F PRES/ABSN URINE INCON ASSESS: CPT | Performed by: NURSE PRACTITIONER

## 2021-01-13 PROCEDURE — G8484 FLU IMMUNIZE NO ADMIN: HCPCS | Performed by: NURSE PRACTITIONER

## 2021-01-13 PROCEDURE — 99214 OFFICE O/P EST MOD 30 MIN: CPT | Performed by: NURSE PRACTITIONER

## 2021-01-13 PROCEDURE — G8417 CALC BMI ABV UP PARAM F/U: HCPCS | Performed by: NURSE PRACTITIONER

## 2021-01-13 PROCEDURE — 1123F ACP DISCUSS/DSCN MKR DOCD: CPT | Performed by: NURSE PRACTITIONER

## 2021-01-13 PROCEDURE — 4040F PNEUMOC VAC/ADMIN/RCVD: CPT | Performed by: NURSE PRACTITIONER

## 2021-01-13 PROCEDURE — G8428 CUR MEDS NOT DOCUMENT: HCPCS | Performed by: NURSE PRACTITIONER

## 2021-01-13 RX ORDER — RANOLAZINE 500 MG/1
500 TABLET, EXTENDED RELEASE ORAL 2 TIMES DAILY
Qty: 60 TABLET | Refills: 3 | Status: SHIPPED | OUTPATIENT
Start: 2021-01-13 | End: 2021-04-14 | Stop reason: SDUPTHER

## 2021-01-13 RX ORDER — CLOPIDOGREL BISULFATE 75 MG/1
75 TABLET ORAL DAILY
Qty: 90 TABLET | Refills: 1 | Status: SHIPPED | OUTPATIENT
Start: 2021-01-13 | End: 2021-07-09

## 2021-01-13 RX ORDER — ASPIRIN 81 MG/1
81 TABLET ORAL DAILY
Qty: 90 TABLET | Refills: 1 | Status: SHIPPED | OUTPATIENT
Start: 2021-01-13 | End: 2021-07-09
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
## 2021-01-15 ENCOUNTER — HOSPITAL ENCOUNTER (OUTPATIENT)
Dept: CARDIOLOGY | Age: 86
Discharge: HOME | End: 2021-01-15
Payer: MEDICARE

## 2021-01-15 DIAGNOSIS — R60.9 SWELLING: ICD-10-CM

## 2021-01-15 DIAGNOSIS — R52 PAIN: ICD-10-CM

## 2021-01-15 PROCEDURE — 93970 EXTREMITY STUDY: CPT

## 2021-01-18 ENCOUNTER — TELEPHONE (OUTPATIENT)
Dept: CARDIOLOGY CLINIC | Age: 86
End: 2021-01-18

## 2021-01-18 NOTE — TELEPHONE ENCOUNTER
----- Message from DIONY Mehta CNP sent at 1/18/2021  7:56 AM EST -----  Ultrasound did not show any clot. It did show Baker's cysts, which you have had in the past, and you can follow up with PCP regarding those cysts.

## 2021-01-19 ENCOUNTER — TELEPHONE (OUTPATIENT)
Dept: CARDIOLOGY CLINIC | Age: 86
End: 2021-01-19

## 2021-01-19 NOTE — TELEPHONE ENCOUNTER
Pt called stating the ranolazine (RANEXA) 500 MG cost to much she cant get it.      She stated she is confused on what medications she should be taking and not taking, should she be taking OTC Asprin and the RX Asprin?    pls advise thank you

## 2021-01-19 NOTE — TELEPHONE ENCOUNTER
Spoke to pt about the ASA she is only to take one ASA. We will ask NPKA about the Ranexa her copay is 61.23 and she is having trouble affording that.

## 2021-01-19 NOTE — TELEPHONE ENCOUNTER
Called and spoke to patient. She states that she got confused as she had an OTC aspirin in addition to the an aspirin filled by her pharmacy. Instructed her to just take one aspirin daily. Ms. Marck Sotomayor also states she doesn't like paying for the Ranexa. She states she can afford it, but she finds it silly to pay for insurance and then have to pay for medications on top of that. She was recently started on more medications after her angiogram and wants doesn't want to have to keep paying for more pills. Will discuss with Dr. Sophie Hightower tomorrow in office and call the patient back then.

## 2021-03-09 ENCOUNTER — TELEPHONE (OUTPATIENT)
Dept: CARDIOLOGY CLINIC | Age: 86
End: 2021-03-09

## 2021-03-09 NOTE — TELEPHONE ENCOUNTER
Pt calling to go over meds to make sure she is taking what she is supposed to. We went over the list and verified.

## 2021-04-14 ENCOUNTER — OFFICE VISIT (OUTPATIENT)
Dept: CARDIOLOGY CLINIC | Age: 86
End: 2021-04-14
Payer: MEDICARE

## 2021-04-14 VITALS
WEIGHT: 162.8 LBS | BODY MASS INDEX: 31.96 KG/M2 | SYSTOLIC BLOOD PRESSURE: 110 MMHG | HEART RATE: 74 BPM | HEIGHT: 60 IN | OXYGEN SATURATION: 97 % | DIASTOLIC BLOOD PRESSURE: 60 MMHG

## 2021-04-14 DIAGNOSIS — I10 ESSENTIAL HYPERTENSION: Primary | ICD-10-CM

## 2021-04-14 DIAGNOSIS — I25.10 CORONARY ARTERY DISEASE INVOLVING NATIVE CORONARY ARTERY OF NATIVE HEART WITHOUT ANGINA PECTORIS: ICD-10-CM

## 2021-04-14 DIAGNOSIS — R60.0 LOCALIZED EDEMA: ICD-10-CM

## 2021-04-14 PROCEDURE — G8427 DOCREV CUR MEDS BY ELIG CLIN: HCPCS | Performed by: NURSE PRACTITIONER

## 2021-04-14 PROCEDURE — 1123F ACP DISCUSS/DSCN MKR DOCD: CPT | Performed by: NURSE PRACTITIONER

## 2021-04-14 PROCEDURE — 4040F PNEUMOC VAC/ADMIN/RCVD: CPT | Performed by: NURSE PRACTITIONER

## 2021-04-14 PROCEDURE — 99214 OFFICE O/P EST MOD 30 MIN: CPT | Performed by: NURSE PRACTITIONER

## 2021-04-14 PROCEDURE — 1090F PRES/ABSN URINE INCON ASSESS: CPT | Performed by: NURSE PRACTITIONER

## 2021-04-14 PROCEDURE — 1036F TOBACCO NON-USER: CPT | Performed by: NURSE PRACTITIONER

## 2021-04-14 PROCEDURE — G8417 CALC BMI ABV UP PARAM F/U: HCPCS | Performed by: NURSE PRACTITIONER

## 2021-04-14 RX ORDER — RANOLAZINE 500 MG/1
500 TABLET, EXTENDED RELEASE ORAL 2 TIMES DAILY
Qty: 60 TABLET | Refills: 3 | Status: SHIPPED | OUTPATIENT
Start: 2021-04-14 | End: 2021-08-30
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
## 2021-04-15 RX ORDER — POTASSIUM CHLORIDE 1500 MG/1
TABLET, EXTENDED RELEASE ORAL
Qty: 90 TABLET | Refills: 1 | Status: SHIPPED | OUTPATIENT
Start: 2021-04-15 | End: 2021-08-17

## 2021-04-15 NOTE — TELEPHONE ENCOUNTER
Refill was requested from pharmacy. Patient is up to date with appointments and lab work.      4/14/2021 OV with CAMERON  2/4/2021 Labs in care everywhere [facility]

## 2021-06-22 ENCOUNTER — TELEPHONE (OUTPATIENT)
Dept: CARDIOLOGY CLINIC | Age: 86
End: 2021-06-22

## 2021-06-22 NOTE — TELEPHONE ENCOUNTER
Pt is considering a knee replacement and is asking what [facility] opinion is regarding her heart condition. Please call to advise.

## 2021-06-23 NOTE — TELEPHONE ENCOUNTER
Ms Marcus Tillman has very severe heart disease with advanced coronary disease which is not amenable to revascularization and should not have any surgery for nonlifethreating disease.

## 2021-06-24 NOTE — TELEPHONE ENCOUNTER
Spoke w/ Dodie Villagomez and let her know Dr Char Alba response. She verbalized understanding. She states she will tell Dr James Mcgowan.

## 2021-07-09 RX ORDER — CLOPIDOGREL BISULFATE 75 MG/1
TABLET ORAL
Qty: 90 TABLET | Refills: 1 | Status: SHIPPED | OUTPATIENT
Start: 2021-07-09 | End: 2022-01-25

## 2021-07-09 RX ORDER — ASPIRIN 81 MG/1
TABLET ORAL
Qty: 90 TABLET | Refills: 1 | Status: SHIPPED | OUTPATIENT
Start: 2021-07-09 | End: 2022-03-01

## 2021-07-16 NOTE — TELEPHONE ENCOUNTER
Received refill request for Amlodipine from [facility] pharmacy.     Last ov:2021 NPKA    Last EK    Last Refill:2020 90 tabs w/3 refills    Next appointment:10/27/2021 [facility]

## 2021-07-20 RX ORDER — AMLODIPINE BESYLATE 5 MG/1
TABLET ORAL
Qty: 90 TABLET | Refills: 3 | Status: SHIPPED | OUTPATIENT
Start: 2021-07-20 | End: 2022-05-26

## 2021-08-17 RX ORDER — POTASSIUM CHLORIDE 1500 MG/1
TABLET, EXTENDED RELEASE ORAL
Qty: 90 TABLET | Refills: 1 | Status: SHIPPED | OUTPATIENT
Start: 2021-08-17 | End: 2021-08-31

## 2021-08-23 ENCOUNTER — TELEPHONE (OUTPATIENT)
Dept: CARDIOLOGY CLINIC | Age: 86
End: 2021-08-23

## 2021-08-23 NOTE — TELEPHONE ENCOUNTER
Per Dr. Denis Philip, increase Torsemide to 50 mg everyday until her apt on Thur. I spoke with pt and gave instructions. Made an apt for her on Thur at the [facility].

## 2021-08-30 ENCOUNTER — OFFICE VISIT (OUTPATIENT)
Dept: CARDIOLOGY CLINIC | Age: 86
End: 2021-08-30
Payer: MEDICARE

## 2021-08-30 VITALS
HEIGHT: 60 IN | BODY MASS INDEX: 30.55 KG/M2 | OXYGEN SATURATION: 97 % | DIASTOLIC BLOOD PRESSURE: 70 MMHG | HEART RATE: 68 BPM | SYSTOLIC BLOOD PRESSURE: 110 MMHG | WEIGHT: 155.6 LBS

## 2021-08-30 DIAGNOSIS — I10 ESSENTIAL HYPERTENSION: Primary | ICD-10-CM

## 2021-08-30 LAB
A/G RATIO: 1.3 (ref 1–2)
ALBUMIN SERPL-MCNC: 3.9 G/DL (ref 3.5–5.7)
ALBUMIN/PROTEIN TOTAL, SER/PL: 6.9 G/DL (ref 6–8.3)
ALP BLD-CCNC: 100 U/L (ref 34–104)
ALT SERPL-CCNC: 9 U/L (ref 7–52)
ANION GAP 4: 11 MMOL/L (ref 7–16)
AST W/O P-5'-P: 15 U/L (ref 15–39)
BILIRUB SERPL-MCNC: 0.5 MG/DL (ref 0.3–1)
BUN BLDV-MCNC: 18 MG/DL (ref 7–25)
CALCIUM SERPL-MCNC: 9 MG/DL (ref 8.6–10.2)
CHLORIDE BLD-SCNC: 101 MMOL/L (ref 98–107)
CO2: 29 MMOL/L (ref 21–31)
CREATININE + EGFR PANEL: 1.5 MG/DL (ref 0.6–1.2)
GFR CALCULATED: 33 ML/MIN/1.73M2
GFR CALCULATED: 40 ML/MIN/1.73M2
GLOBULIN: 3 G/DL (ref 2.6–4.2)
GLUCOSE: 88 MG/DL (ref 74–109)
POTASSIUM SERPL-SCNC: 3.3 MMOL/L (ref 3.5–5.3)
SODIUM BLD-SCNC: 141 MMOL/L (ref 136–145)

## 2021-08-30 PROCEDURE — 99214 OFFICE O/P EST MOD 30 MIN: CPT | Performed by: INTERNAL MEDICINE

## 2021-08-30 PROCEDURE — G8427 DOCREV CUR MEDS BY ELIG CLIN: HCPCS | Performed by: INTERNAL MEDICINE

## 2021-08-30 PROCEDURE — G8417 CALC BMI ABV UP PARAM F/U: HCPCS | Performed by: INTERNAL MEDICINE

## 2021-08-30 PROCEDURE — 1090F PRES/ABSN URINE INCON ASSESS: CPT | Performed by: INTERNAL MEDICINE

## 2021-08-30 PROCEDURE — 1036F TOBACCO NON-USER: CPT | Performed by: INTERNAL MEDICINE

## 2021-08-30 PROCEDURE — 93000 ELECTROCARDIOGRAM COMPLETE: CPT | Performed by: INTERNAL MEDICINE

## 2021-08-30 PROCEDURE — 4040F PNEUMOC VAC/ADMIN/RCVD: CPT | Performed by: INTERNAL MEDICINE

## 2021-08-30 PROCEDURE — 1123F ACP DISCUSS/DSCN MKR DOCD: CPT | Performed by: INTERNAL MEDICINE

## 2021-08-30 RX ORDER — RANOLAZINE 1000 MG/1
1000 TABLET, EXTENDED RELEASE ORAL 2 TIMES DAILY
Qty: 60 TABLET | Refills: 6 | Status: SHIPPED | OUTPATIENT
Start: 2021-08-30 | End: 2021-09-13 | Stop reason: SDUPTHER
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
## 2021-08-31 ENCOUNTER — TELEPHONE (OUTPATIENT)
Dept: CARDIOLOGY CLINIC | Age: 86
End: 2021-08-31

## 2021-08-31 RX ORDER — POTASSIUM CHLORIDE 20 MEQ/1
20 TABLET, EXTENDED RELEASE ORAL 2 TIMES DAILY
Qty: 90 TABLET | Refills: 1
Start: 2021-08-31 | End: 2022-02-08

## 2021-08-31 NOTE — TELEPHONE ENCOUNTER
Called pt and provided her with lab results and advised to increase Klor-con 20meq to BID. She verbalized understanding. She states she has enough pills for now and does not need a refill at this time. She will call when she needs it sent in.

## 2021-09-13 RX ORDER — RANOLAZINE 1000 MG/1
1000 TABLET, EXTENDED RELEASE ORAL 2 TIMES DAILY
Qty: 180 TABLET | Refills: 1 | Status: SHIPPED | OUTPATIENT
Start: 2021-09-13 | End: 2022-02-08

## 2021-09-22 RX ORDER — TORSEMIDE 100 MG/1
TABLET ORAL
Qty: 90 TABLET | Refills: 1 | Status: SHIPPED | OUTPATIENT
Start: 2021-09-22 | End: 2022-06-01

## 2021-10-27 ENCOUNTER — OFFICE VISIT (OUTPATIENT)
Dept: CARDIOLOGY CLINIC | Age: 86
End: 2021-10-27
Payer: MEDICARE

## 2021-10-27 VITALS
BODY MASS INDEX: 30.43 KG/M2 | HEART RATE: 65 BPM | OXYGEN SATURATION: 96 % | HEIGHT: 60 IN | WEIGHT: 155 LBS | DIASTOLIC BLOOD PRESSURE: 60 MMHG | SYSTOLIC BLOOD PRESSURE: 114 MMHG

## 2021-10-27 DIAGNOSIS — I10 PRIMARY HYPERTENSION: ICD-10-CM

## 2021-10-27 DIAGNOSIS — I25.10 CORONARY ARTERY DISEASE INVOLVING NATIVE CORONARY ARTERY OF NATIVE HEART WITHOUT ANGINA PECTORIS: Primary | ICD-10-CM

## 2021-10-27 DIAGNOSIS — E78.49 OTHER HYPERLIPIDEMIA: ICD-10-CM

## 2021-10-27 DIAGNOSIS — R60.0 LOCALIZED EDEMA: ICD-10-CM

## 2021-10-27 PROCEDURE — 99214 OFFICE O/P EST MOD 30 MIN: CPT | Performed by: INTERNAL MEDICINE

## 2021-10-27 PROCEDURE — G8427 DOCREV CUR MEDS BY ELIG CLIN: HCPCS | Performed by: INTERNAL MEDICINE

## 2021-10-27 PROCEDURE — G8484 FLU IMMUNIZE NO ADMIN: HCPCS | Performed by: INTERNAL MEDICINE

## 2021-10-27 PROCEDURE — G8417 CALC BMI ABV UP PARAM F/U: HCPCS | Performed by: INTERNAL MEDICINE

## 2021-10-27 PROCEDURE — 1090F PRES/ABSN URINE INCON ASSESS: CPT | Performed by: INTERNAL MEDICINE

## 2021-10-27 PROCEDURE — 4040F PNEUMOC VAC/ADMIN/RCVD: CPT | Performed by: INTERNAL MEDICINE

## 2021-10-27 PROCEDURE — 1123F ACP DISCUSS/DSCN MKR DOCD: CPT | Performed by: INTERNAL MEDICINE

## 2021-10-27 PROCEDURE — 1036F TOBACCO NON-USER: CPT | Performed by: INTERNAL MEDICINE
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
## 2021-10-29 ENCOUNTER — TELEPHONE (OUTPATIENT)
Dept: CARDIOLOGY CLINIC | Age: 86
End: 2021-10-29

## 2021-11-01 LAB
A/G RATIO: 1.1 (ref 1–2)
ALBUMIN SERPL-MCNC: 3.7 G/DL (ref 3.5–5.7)
ALBUMIN/PROTEIN TOTAL, SER/PL: 7.1 G/DL (ref 6–8.3)
ALP BLD-CCNC: 134 U/L (ref 34–104)
ALT SERPL-CCNC: 9 U/L (ref 7–52)
ANION GAP 4: 8 MMOL/L (ref 7–16)
AST W/O P-5'-P: 15 U/L (ref 15–39)
BILIRUB SERPL-MCNC: 0.6 MG/DL (ref 0.3–1)
BUN BLDV-MCNC: 22 MG/DL (ref 7–25)
CALCIUM SERPL-MCNC: 9.7 MG/DL (ref 8.6–10.2)
CHLORIDE BLD-SCNC: 101 MMOL/L (ref 98–107)
CHOLESTEROL, STONE: 151 MG/DL
CO2: 29 MMOL/L (ref 21–31)
CREATININE + EGFR PANEL: 1.6 MG/DL (ref 0.6–1.2)
GFR CALCULATED: 30 ML/MIN/1.73M2
GFR CALCULATED: 37 ML/MIN/1.73M2
GLOBULIN: 3.4 G/DL (ref 2.6–4.2)
GLUCOSE: 90 MG/DL (ref 74–109)
HDLC SERPL-MCNC: 43 MG/DL (ref 40–60)
LDL CHOLESTEROL CALCULATED: 90 MG/DL (ref 0–99)
POTASSIUM SERPL-SCNC: 4.8 MMOL/L (ref 3.5–5.3)
SODIUM BLD-SCNC: 138 MMOL/L (ref 136–145)
TRIGL SERPL-MCNC: 89 MG/DL
VLDLC SERPL CALC-MCNC: 18 MG/DL (ref 0–40)

## 2021-11-22 RX ORDER — METOPROLOL SUCCINATE 25 MG/1
TABLET, EXTENDED RELEASE ORAL
Qty: 90 TABLET | Refills: 3 | Status: SHIPPED | OUTPATIENT
Start: 2021-11-22

## 2021-11-22 RX ORDER — ISOSORBIDE MONONITRATE 60 MG/1
TABLET, EXTENDED RELEASE ORAL
Qty: 90 TABLET | Refills: 3 | Status: SHIPPED | OUTPATIENT
Start: 2021-11-22 | End: 2022-07-26 | Stop reason: SDUPTHER

## 2021-12-08 ENCOUNTER — OFFICE VISIT (OUTPATIENT)
Dept: CARDIOLOGY CLINIC | Age: 86
End: 2021-12-08
Payer: MEDICARE

## 2021-12-08 VITALS
RESPIRATION RATE: 16 BRPM | HEIGHT: 60 IN | OXYGEN SATURATION: 95 % | SYSTOLIC BLOOD PRESSURE: 120 MMHG | WEIGHT: 156.8 LBS | DIASTOLIC BLOOD PRESSURE: 80 MMHG | BODY MASS INDEX: 30.78 KG/M2 | HEART RATE: 64 BPM

## 2021-12-08 DIAGNOSIS — R60.0 LOCALIZED EDEMA: ICD-10-CM

## 2021-12-08 DIAGNOSIS — I10 ESSENTIAL HYPERTENSION: Primary | ICD-10-CM

## 2021-12-08 DIAGNOSIS — I25.10 CORONARY ARTERY DISEASE INVOLVING NATIVE CORONARY ARTERY OF NATIVE HEART WITHOUT ANGINA PECTORIS: ICD-10-CM

## 2021-12-08 DIAGNOSIS — E78.2 MIXED HYPERLIPIDEMIA: ICD-10-CM

## 2021-12-08 PROCEDURE — 4040F PNEUMOC VAC/ADMIN/RCVD: CPT | Performed by: NURSE PRACTITIONER

## 2021-12-08 PROCEDURE — 1036F TOBACCO NON-USER: CPT | Performed by: NURSE PRACTITIONER

## 2021-12-08 PROCEDURE — G8484 FLU IMMUNIZE NO ADMIN: HCPCS | Performed by: NURSE PRACTITIONER

## 2021-12-08 PROCEDURE — 1090F PRES/ABSN URINE INCON ASSESS: CPT | Performed by: NURSE PRACTITIONER

## 2021-12-08 PROCEDURE — G8417 CALC BMI ABV UP PARAM F/U: HCPCS | Performed by: NURSE PRACTITIONER

## 2021-12-08 PROCEDURE — 1123F ACP DISCUSS/DSCN MKR DOCD: CPT | Performed by: NURSE PRACTITIONER

## 2021-12-08 PROCEDURE — 99214 OFFICE O/P EST MOD 30 MIN: CPT | Performed by: NURSE PRACTITIONER

## 2021-12-08 PROCEDURE — G8427 DOCREV CUR MEDS BY ELIG CLIN: HCPCS | Performed by: NURSE PRACTITIONER

## 2021-12-08 RX ORDER — ATORVASTATIN CALCIUM 40 MG/1
40 TABLET, FILM COATED ORAL NIGHTLY
Qty: 90 TABLET | Refills: 2 | Status: SHIPPED | OUTPATIENT
Start: 2021-12-08 | End: 2022-02-08 | Stop reason: SDUPTHER
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
## 2021-12-08 NOTE — PATIENT INSTRUCTIONS
Increase your atorvastatin and take 40 mg once a day. Use up what you have and take 2 pills (20mg) once a day.  Your new prescription will have 40 mg tablets in them    Repeat blood work around the end of January

## 2022-01-25 RX ORDER — CLOPIDOGREL BISULFATE 75 MG/1
TABLET ORAL
Qty: 90 TABLET | Refills: 1 | Status: SHIPPED | OUTPATIENT
Start: 2022-01-25 | End: 2022-08-22

## 2022-02-08 ENCOUNTER — OFFICE VISIT (OUTPATIENT)
Dept: CARDIOLOGY CLINIC | Age: 87
End: 2022-02-08
Payer: MEDICARE

## 2022-02-08 VITALS
HEIGHT: 60 IN | BODY MASS INDEX: 30.63 KG/M2 | SYSTOLIC BLOOD PRESSURE: 108 MMHG | WEIGHT: 156 LBS | DIASTOLIC BLOOD PRESSURE: 64 MMHG | OXYGEN SATURATION: 97 % | HEART RATE: 86 BPM

## 2022-02-08 DIAGNOSIS — E78.49 OTHER HYPERLIPIDEMIA: Primary | ICD-10-CM

## 2022-02-08 DIAGNOSIS — I25.10 CORONARY ARTERY DISEASE INVOLVING NATIVE CORONARY ARTERY OF NATIVE HEART WITHOUT ANGINA PECTORIS: ICD-10-CM

## 2022-02-08 DIAGNOSIS — I10 PRIMARY HYPERTENSION: ICD-10-CM

## 2022-02-08 LAB
EKG ATRIAL RATE: 83 BPM
EKG DIAGNOSIS: NORMAL
EKG P AXIS: 51 DEGREES
EKG P-R INTERVAL: 160 MS
EKG Q-T INTERVAL: 378 MS
EKG QRS DURATION: 78 MS
EKG QTC CALCULATION (BAZETT): 444 MS
EKG R AXIS: -11 DEGREES
EKG T AXIS: 56 DEGREES
EKG VENTRICULAR RATE: 83 BPM

## 2022-02-08 PROCEDURE — 1123F ACP DISCUSS/DSCN MKR DOCD: CPT | Performed by: INTERNAL MEDICINE

## 2022-02-08 PROCEDURE — 1036F TOBACCO NON-USER: CPT | Performed by: INTERNAL MEDICINE

## 2022-02-08 PROCEDURE — G8417 CALC BMI ABV UP PARAM F/U: HCPCS | Performed by: INTERNAL MEDICINE

## 2022-02-08 PROCEDURE — 1090F PRES/ABSN URINE INCON ASSESS: CPT | Performed by: INTERNAL MEDICINE

## 2022-02-08 PROCEDURE — 4040F PNEUMOC VAC/ADMIN/RCVD: CPT | Performed by: INTERNAL MEDICINE

## 2022-02-08 PROCEDURE — G8484 FLU IMMUNIZE NO ADMIN: HCPCS | Performed by: INTERNAL MEDICINE

## 2022-02-08 PROCEDURE — 93000 ELECTROCARDIOGRAM COMPLETE: CPT | Performed by: INTERNAL MEDICINE

## 2022-02-08 PROCEDURE — G8427 DOCREV CUR MEDS BY ELIG CLIN: HCPCS | Performed by: INTERNAL MEDICINE

## 2022-02-08 PROCEDURE — 99214 OFFICE O/P EST MOD 30 MIN: CPT | Performed by: INTERNAL MEDICINE

## 2022-02-08 RX ORDER — POTASSIUM CHLORIDE 20 MEQ/1
20 TABLET, EXTENDED RELEASE ORAL 2 TIMES DAILY
Qty: 90 TABLET | Refills: 1 | Status: CANCELLED | OUTPATIENT
Start: 2022-02-08

## 2022-02-08 RX ORDER — ATORVASTATIN CALCIUM 40 MG/1
40 TABLET, FILM COATED ORAL NIGHTLY
Qty: 90 TABLET | Refills: 2 | Status: SHIPPED | OUTPATIENT
Start: 2022-02-08 | End: 2022-04-25

## 2022-02-08 RX ORDER — TRAMADOL HYDROCHLORIDE 50 MG/1
TABLET ORAL
COMMUNITY
Start: 2021-12-22 | End: 2022-10-26

## 2022-02-08 RX ORDER — PREDNISONE 10 MG/1
TABLET ORAL
COMMUNITY
Start: 2022-01-25
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
## 2022-02-10 LAB
ANION GAP 4: 10 MMOL/L (ref 7–16)
BUN BLDV-MCNC: 23 MG/DL (ref 7–25)
CALCIUM SERPL-MCNC: 9.4 MG/DL (ref 8.6–10.2)
CHLORIDE BLD-SCNC: 107 MMOL/L (ref 98–107)
CO2: 28 MMOL/L (ref 21–31)
CREATININE + EGFR PANEL: 1.1 MG/DL (ref 0.6–1.2)
GFR CALCULATED: 47 ML/MIN/1.73M2
GFR CALCULATED: 57 ML/MIN/1.73M2
GLUCOSE: 86 MG/DL (ref 74–109)
POTASSIUM SERPL-SCNC: 3.9 MMOL/L (ref 3.5–5.3)
SODIUM BLD-SCNC: 145 MMOL/L (ref 136–145)

## 2022-02-23 ENCOUNTER — TELEPHONE (OUTPATIENT)
Dept: CARDIOLOGY CLINIC | Age: 87
End: 2022-02-23

## 2022-02-23 NOTE — TELEPHONE ENCOUNTER
Pt calling to report that her bowels will move,  without her knowing it. Not a lot, put a small formed stool will slip out. She will feel it in her pants, or smell it. She is afraid to go anywhere, for fear of it happening. This has been a problem since December. Has not consulted her PCP, or GI. No new medications, or diet changes.

## 2022-02-23 NOTE — TELEPHONE ENCOUNTER
I spoke with pt, this is an ongoing problem that is getting worse. I advised her to call her PCP. Pt verbalized understanding.

## 2022-03-01 RX ORDER — ASPIRIN 81 MG/1
TABLET, COATED ORAL
Qty: 90 TABLET | Refills: 3 | Status: SHIPPED | OUTPATIENT
Start: 2022-03-01

## 2022-03-01 NOTE — TELEPHONE ENCOUNTER
Refill was requested from pharmacy. Patient is up to date with appointments and lab work.      2/8/2022 OV with MALKA

## 2022-03-28 RX ORDER — ATORVASTATIN CALCIUM 40 MG/1
40 TABLET, FILM COATED ORAL DAILY
Qty: 90 TABLET | Refills: 1 | OUTPATIENT
Start: 2022-03-28

## 2022-04-25 RX ORDER — ATORVASTATIN CALCIUM 40 MG/1
40 TABLET, FILM COATED ORAL DAILY
Qty: 90 TABLET | Refills: 1 | Status: SHIPPED | OUTPATIENT
Start: 2022-04-25

## 2022-04-25 NOTE — TELEPHONE ENCOUNTER
Refill was requested from pharmacy. Patient is up to date with appointments and lab work. Atorvastatin was increased to 40 mg by CAMERON.

## 2022-05-26 RX ORDER — AMLODIPINE BESYLATE 5 MG/1
TABLET ORAL
Qty: 90 TABLET | Refills: 3 | Status: SHIPPED | OUTPATIENT
Start: 2022-05-26

## 2022-05-26 NOTE — TELEPHONE ENCOUNTER
Received refill request for Amlodipine 5mg from [facility]     Last OV : 2/8/22 DAH    Last EKG :  2/8/22    Last Refill : 7/20/21 90 w/3 refills    Next OV : No scheduled ov.

## 2022-06-01 ENCOUNTER — OFFICE VISIT (OUTPATIENT)
Dept: CARDIOLOGY CLINIC | Age: 87
End: 2022-06-01
Payer: MEDICARE

## 2022-06-01 VITALS
DIASTOLIC BLOOD PRESSURE: 60 MMHG | HEART RATE: 56 BPM | WEIGHT: 151 LBS | BODY MASS INDEX: 29.64 KG/M2 | OXYGEN SATURATION: 96 % | HEIGHT: 60 IN | SYSTOLIC BLOOD PRESSURE: 116 MMHG

## 2022-06-01 DIAGNOSIS — I10 ESSENTIAL HYPERTENSION: Primary | ICD-10-CM

## 2022-06-01 DIAGNOSIS — I25.10 CORONARY ARTERY DISEASE INVOLVING NATIVE CORONARY ARTERY OF NATIVE HEART WITHOUT ANGINA PECTORIS: ICD-10-CM

## 2022-06-01 DIAGNOSIS — R60.0 LOCALIZED EDEMA: ICD-10-CM

## 2022-06-01 DIAGNOSIS — E78.2 MIXED HYPERLIPIDEMIA: ICD-10-CM

## 2022-06-01 PROCEDURE — 99214 OFFICE O/P EST MOD 30 MIN: CPT | Performed by: NURSE PRACTITIONER

## 2022-06-01 PROCEDURE — G8417 CALC BMI ABV UP PARAM F/U: HCPCS | Performed by: NURSE PRACTITIONER

## 2022-06-01 PROCEDURE — 1090F PRES/ABSN URINE INCON ASSESS: CPT | Performed by: NURSE PRACTITIONER

## 2022-06-01 PROCEDURE — G8427 DOCREV CUR MEDS BY ELIG CLIN: HCPCS | Performed by: NURSE PRACTITIONER

## 2022-06-01 PROCEDURE — 1123F ACP DISCUSS/DSCN MKR DOCD: CPT | Performed by: NURSE PRACTITIONER

## 2022-06-01 PROCEDURE — 1036F TOBACCO NON-USER: CPT | Performed by: NURSE PRACTITIONER

## 2022-06-01 RX ORDER — RANOLAZINE 1000 MG/1
1000 TABLET, EXTENDED RELEASE ORAL 2 TIMES DAILY
COMMUNITY
Start: 2022-05-26 | End: 2022-06-01

## 2022-06-01 RX ORDER — ASCORBIC ACID 500 MG
500 TABLET ORAL DAILY
COMMUNITY
Start: 2022-05-27 | End: 2022-10-26

## 2022-06-01 RX ORDER — BENZONATATE 100 MG/1
200 CAPSULE ORAL EVERY 8 HOURS PRN
COMMUNITY
Start: 2022-05-26 | End: 2022-06-25

## 2022-06-01 RX ORDER — DEXAMETHASONE 6 MG/1
6 TABLET ORAL ONCE
COMMUNITY
Start: 2022-05-26 | End: 2022-10-26

## 2022-06-01 RX ORDER — TORSEMIDE 100 MG/1
TABLET ORAL
Qty: 90 TABLET | Refills: 1
Start: 2022-06-01 | End: 2022-07-26 | Stop reason: SDUPTHER

## 2022-06-01 NOTE — PATIENT INSTRUCTIONS
Take metoprolol at night and amlodipine in the morning    Resume torsemide at 1/4 tablet twice a week    Check blood work 2 weeks     Call if you notice increasing swelling or shortness of breath or a weight gain of 3 lbs overnight or 5 lbs in a week.
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
## 2022-07-07 ENCOUNTER — TELEPHONE (OUTPATIENT)
Dept: CARDIOLOGY CLINIC | Age: 87
End: 2022-07-07

## 2022-07-07 NOTE — TELEPHONE ENCOUNTER
Pt called for the past week, pt stated  her right leg is seeping fluids above ankle, it drips when she is sitting, when pt get up and moves around it comes out faster. It is leaking so much she thought she had leak in get house. Pt also stated her shoulder is hurting from shoulder to shoulder. Pt stated her chest is feels tight, stomach is blotted all the time. Pt stated she is on 1/4 MG torsemide,  Dr Farshad Mejia MD told her to to start asking 1/2 MG for the passed two day.      [employer]   [phone]

## 2022-07-07 NOTE — TELEPHONE ENCOUNTER
Called and spoke to patient. She is going to continue with half a tablet every day through Monday and then resume every other day dosing. Yordy Contie says she can't tell if swelling or breathing has improved much on the increased dose over the past 2 days. She will call with an update on Tuesday.

## 2022-07-12 ENCOUNTER — TELEPHONE (OUTPATIENT)
Dept: CARDIOLOGY CLINIC | Age: 87
End: 2022-07-12

## 2022-07-12 NOTE — TELEPHONE ENCOUNTER
Pt states her legs are not weeping since cutting back on her water pill. Also wanted NPKA to know that she had a  synvisc injection in left knee today.

## 2022-07-26 RX ORDER — TORSEMIDE 100 MG/1
TABLET ORAL
Qty: 90 TABLET | Refills: 1 | Status: SHIPPED | OUTPATIENT
Start: 2022-07-26

## 2022-07-26 RX ORDER — ISOSORBIDE MONONITRATE 60 MG/1
TABLET, EXTENDED RELEASE ORAL
Qty: 90 TABLET | Refills: 3 | Status: SHIPPED | OUTPATIENT
Start: 2022-07-26

## 2022-07-26 NOTE — TELEPHONE ENCOUNTER
Medication Refill    Medication needing refilled:  isosorbide mononitrate (IMDUR) 60 MG     torsemide (DEMADEX) 100 MG     Dosage of the medication:    How are you taking this medication (QD, BID, TID, QID, PRN):    30 or 90 day supply called in: 90 day supply    When will you run out of your medication:    Which Pharmacy are we sending the medication to?:   [facility]/pharmacy #[id number]- [place], [place] - [facility] [phone] [phone]   [facility] [address], [place] [address]   Phone:  [phone]  Fax:  [phone]

## 2022-08-22 RX ORDER — CLOPIDOGREL BISULFATE 75 MG/1
TABLET ORAL
Qty: 90 TABLET | Refills: 1 | Status: SHIPPED | OUTPATIENT
Start: 2022-08-22

## 2022-08-22 NOTE — TELEPHONE ENCOUNTER
Refill was requested from pharmacy. 6/1/2022 OV with CAMERON    When should Joyce Toscano see you again? She does not have an apt. Her Torsemide has been adjusted according to edema via phone calls. I will call pt and remind her to get labs done she never did get labs done from 6/1.

## 2022-08-31 ENCOUNTER — OFFICE VISIT (OUTPATIENT)
Dept: CARDIOLOGY CLINIC | Age: 87
End: 2022-08-31
Payer: MEDICARE

## 2022-08-31 VITALS
SYSTOLIC BLOOD PRESSURE: 122 MMHG | BODY MASS INDEX: 29.8 KG/M2 | HEART RATE: 69 BPM | WEIGHT: 151.8 LBS | HEIGHT: 60 IN | DIASTOLIC BLOOD PRESSURE: 60 MMHG | OXYGEN SATURATION: 97 %

## 2022-08-31 DIAGNOSIS — I25.10 CORONARY ARTERY DISEASE INVOLVING NATIVE CORONARY ARTERY OF NATIVE HEART WITHOUT ANGINA PECTORIS: ICD-10-CM

## 2022-08-31 DIAGNOSIS — R60.0 LOCALIZED EDEMA: ICD-10-CM

## 2022-08-31 DIAGNOSIS — I10 ESSENTIAL HYPERTENSION: Primary | ICD-10-CM

## 2022-08-31 DIAGNOSIS — E78.49 OTHER HYPERLIPIDEMIA: ICD-10-CM

## 2022-08-31 PROCEDURE — G8427 DOCREV CUR MEDS BY ELIG CLIN: HCPCS | Performed by: NURSE PRACTITIONER

## 2022-08-31 PROCEDURE — 1090F PRES/ABSN URINE INCON ASSESS: CPT | Performed by: NURSE PRACTITIONER

## 2022-08-31 PROCEDURE — G8417 CALC BMI ABV UP PARAM F/U: HCPCS | Performed by: NURSE PRACTITIONER

## 2022-08-31 PROCEDURE — 99214 OFFICE O/P EST MOD 30 MIN: CPT | Performed by: NURSE PRACTITIONER

## 2022-08-31 PROCEDURE — 1036F TOBACCO NON-USER: CPT | Performed by: NURSE PRACTITIONER

## 2022-08-31 PROCEDURE — 1123F ACP DISCUSS/DSCN MKR DOCD: CPT | Performed by: NURSE PRACTITIONER
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
## 2022-10-20 ENCOUNTER — TELEPHONE (OUTPATIENT)
Dept: CARDIOLOGY CLINIC | Age: 87
End: 2022-10-20

## 2022-10-20 NOTE — TELEPHONE ENCOUNTER
Pt does not have a scale to weigh herself. SOB is no different. As of now its just her left leg that is weeping for the last 2 days. She says her legs are tight and hard to the touch. I confirmed that she is taking the 1/2 tab of Torsemide qod. The wash cloth that she put on her leg last night was soaked through this morning.

## 2022-10-20 NOTE — TELEPHONE ENCOUNTER
Pt called in stating that when she woke up this morning she noticed that there was a wet spot on her bed under her leg. She then checked to see hat was going on she noticed that she has clear water coming out of her left leg above the ankle. Pt stated she noticed yesterday when she woke up there was a wet spot on her bed and couldn't figure out what happened sp she stated watching thing and then noticed the clear water. She states she isn't in any pain she just has the clear water coming from her leg so she figured she better call it in. She wrapped an old sock around it.     Pt can be reached at [phone]

## 2022-10-20 NOTE — TELEPHONE ENCOUNTER
Called and spoke with Peg Preciado. She says that her L leg started weeping the day before yesterday. No redness, warmth, or signs on infection. She says she has been tieing a washcloth to her leg with a shoestring to help with the drainage. She says her SOB traditionally gets worse with the colder weather. She no longer has a scale but clothes are fitting looser. She normally takes 0.5 tablet of torsemide every other day but didn't think she should take it lately with \"water on her legs. \" Last dose was 10/17. Instructed her to take her torsemide today, tomorrow, and Friday and then resume her every other day dosing. I have scheduled her to come in for appointment 10/26 at 230 with me in [place].

## 2022-10-26 ENCOUNTER — OFFICE VISIT (OUTPATIENT)
Dept: CARDIOLOGY CLINIC | Age: 87
End: 2022-10-26
Payer: MEDICARE

## 2022-10-26 VITALS
WEIGHT: 146.6 LBS | HEART RATE: 75 BPM | SYSTOLIC BLOOD PRESSURE: 118 MMHG | DIASTOLIC BLOOD PRESSURE: 70 MMHG | OXYGEN SATURATION: 97 % | BODY MASS INDEX: 30.77 KG/M2 | HEIGHT: 58 IN

## 2022-10-26 DIAGNOSIS — E78.49 OTHER HYPERLIPIDEMIA: ICD-10-CM

## 2022-10-26 DIAGNOSIS — I10 ESSENTIAL HYPERTENSION: Primary | ICD-10-CM

## 2022-10-26 DIAGNOSIS — R60.0 LOCALIZED EDEMA: ICD-10-CM

## 2022-10-26 DIAGNOSIS — I25.10 CORONARY ARTERY DISEASE INVOLVING NATIVE CORONARY ARTERY OF NATIVE HEART WITHOUT ANGINA PECTORIS: ICD-10-CM

## 2022-10-26 PROCEDURE — 99214 OFFICE O/P EST MOD 30 MIN: CPT | Performed by: NURSE PRACTITIONER

## 2022-10-26 PROCEDURE — 1123F ACP DISCUSS/DSCN MKR DOCD: CPT | Performed by: NURSE PRACTITIONER

## 2022-10-26 PROCEDURE — G8427 DOCREV CUR MEDS BY ELIG CLIN: HCPCS | Performed by: NURSE PRACTITIONER

## 2022-10-26 PROCEDURE — 1090F PRES/ABSN URINE INCON ASSESS: CPT | Performed by: NURSE PRACTITIONER

## 2022-10-26 PROCEDURE — 1036F TOBACCO NON-USER: CPT | Performed by: NURSE PRACTITIONER

## 2022-10-26 PROCEDURE — G8417 CALC BMI ABV UP PARAM F/U: HCPCS | Performed by: NURSE PRACTITIONER

## 2022-10-26 PROCEDURE — G8484 FLU IMMUNIZE NO ADMIN: HCPCS | Performed by: NURSE PRACTITIONER

## 2022-10-26 RX ORDER — HYDROCODONE BITARTRATE AND ACETAMINOPHEN 5; 325 MG/1; MG/1
TABLET ORAL
COMMUNITY
Start: 2022-10-01 | End: 2022-11-10
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
## 2023-01-24 ENCOUNTER — TELEPHONE (OUTPATIENT)
Dept: CARDIOLOGY CLINIC | Age: 88
End: 2023-01-24

## 2023-01-24 NOTE — TELEPHONE ENCOUNTER
[address] Nurse call need clarification on the RX for the Amlodipine, They were under the understating pt was not taking the the amlodipine however it is in the patients pillbox.     Pls advise thank you     Shaji Ocampo   [phone]

## 2023-01-24 NOTE — TELEPHONE ENCOUNTER
I spoke with Christiana walker's daughter. Amlodipine was listed as discontinued on her discharge list  from [facility] from her fall and hip surgery. She is now on Xarelto after surgery and DVT. They have since F/U with the PCP but the Amlodipine was not discussed. They wanted an apt to see Dr. Maggie Patterson to discuss meds. [facility] She is currently taking the Amlodipine for now.    Most recent B/P   117/70  120/56  138/72
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
## 2023-01-30 ENCOUNTER — OFFICE VISIT (OUTPATIENT)
Dept: CARDIOLOGY CLINIC | Age: 88
End: 2023-01-30
Payer: MEDICARE

## 2023-01-30 VITALS
HEART RATE: 70 BPM | HEIGHT: 58 IN | SYSTOLIC BLOOD PRESSURE: 118 MMHG | WEIGHT: 137 LBS | DIASTOLIC BLOOD PRESSURE: 64 MMHG | OXYGEN SATURATION: 98 % | BODY MASS INDEX: 28.76 KG/M2

## 2023-01-30 DIAGNOSIS — I82.4Z9 DEEP VEIN THROMBOSIS (DVT) OF DISTAL VEIN OF LOWER EXTREMITY, UNSPECIFIED CHRONICITY, UNSPECIFIED LATERALITY (HCC): ICD-10-CM

## 2023-01-30 DIAGNOSIS — I10 ESSENTIAL HYPERTENSION: ICD-10-CM

## 2023-01-30 DIAGNOSIS — E78.2 MIXED HYPERLIPIDEMIA: ICD-10-CM

## 2023-01-30 DIAGNOSIS — I25.10 CORONARY ARTERY DISEASE INVOLVING NATIVE CORONARY ARTERY OF NATIVE HEART WITHOUT ANGINA PECTORIS: Primary | ICD-10-CM

## 2023-01-30 PROCEDURE — G8417 CALC BMI ABV UP PARAM F/U: HCPCS | Performed by: INTERNAL MEDICINE

## 2023-01-30 PROCEDURE — G8427 DOCREV CUR MEDS BY ELIG CLIN: HCPCS | Performed by: INTERNAL MEDICINE

## 2023-01-30 PROCEDURE — G8484 FLU IMMUNIZE NO ADMIN: HCPCS | Performed by: INTERNAL MEDICINE

## 2023-01-30 PROCEDURE — 1123F ACP DISCUSS/DSCN MKR DOCD: CPT | Performed by: INTERNAL MEDICINE

## 2023-01-30 PROCEDURE — 99214 OFFICE O/P EST MOD 30 MIN: CPT | Performed by: INTERNAL MEDICINE

## 2023-01-30 PROCEDURE — 1036F TOBACCO NON-USER: CPT | Performed by: INTERNAL MEDICINE

## 2023-01-30 PROCEDURE — 1090F PRES/ABSN URINE INCON ASSESS: CPT | Performed by: INTERNAL MEDICINE

## 2023-01-30 RX ORDER — ONDANSETRON 4 MG/1
4 TABLET, FILM COATED ORAL EVERY 8 HOURS PRN
COMMUNITY
Start: 2022-12-13

## 2023-01-30 RX ORDER — RIVAROXABAN 20 MG/1
TABLET, FILM COATED ORAL
COMMUNITY
Start: 2023-01-13 | End: 2023-01-30

## 2023-01-30 RX ORDER — CLOPIDOGREL BISULFATE 75 MG/1
TABLET ORAL
Qty: 90 TABLET | Refills: 3 | Status: SHIPPED | OUTPATIENT
Start: 2023-01-30

## 2023-01-30 RX ORDER — HYDROCODONE BITARTRATE AND ACETAMINOPHEN 5; 325 MG/1; MG/1
1 TABLET ORAL EVERY 6 HOURS PRN
COMMUNITY

## 2023-03-01 ENCOUNTER — TELEPHONE (OUTPATIENT)
Dept: CARDIOLOGY CLINIC | Age: 88
End: 2023-03-01

## 2023-03-01 NOTE — TELEPHONE ENCOUNTER
"SUBJECTIVE:   Chief Complaint   Patient presents with   • Anovulation      Int ID [id number]  Kristen Mota is a 29 y.o.  who presents for infertility, anovulation. She    Patient's last menstrual period was 2020 (exact date).   She took letrozole -.    Her hcg yesterday was 1.08.  She did not use OPKs, but did have timed intercourse.    Reports that last cycle it took 6 days after stop of Provera to have a period.    History reviewed. No pertinent past medical history.  History reviewed. No pertinent surgical history.  OB History    Para Term  AB Living   0 0 0 0 0 0   SAB TAB Ectopic Molar Multiple Live Births   0 0 0 0 0 0      Social History     Tobacco Use   • Smoking status: Never Smoker   • Smokeless tobacco: Never Used   Substance Use Topics   • Alcohol use: Never     Frequency: Never   • Drug use: Never     Family History   Problem Relation Age of Onset   • Breast cancer Neg Hx    • Ovarian cancer Neg Hx    • Uterine cancer Neg Hx    • Colon cancer Neg Hx    • Deep vein thrombosis Neg Hx    • Pulmonary embolism Neg Hx      Current Outpatient Medications on File Prior to Visit   Medication Sig Dispense Refill   • ferrous sulfate 325 (65 FE) MG tablet Take 1 tablet by mouth Daily With Breakfast. 30 tablet 6   • Prenatal Vit-Fe Fumarate-FA (PRENATAL VITAMIN 28-0.8) 28-0.8 MG tablet tablet Take 1 tablet by mouth Daily. 90 tablet 4     No current facility-administered medications on file prior to visit.      No Known Allergies     Review of Systems   Constitutional: Negative.    HENT: Negative.    Respiratory: Negative.    Cardiovascular: Negative.    Gastrointestinal: Negative.    Endocrine: Negative.    Genitourinary: Negative.    Musculoskeletal: Negative.    Skin: Negative.    Neurological: Negative.    Psychiatric/Behavioral: Negative.          OBJECTIVE:   Vitals:    09/10/20 1122   BP: 125/85   Pulse: 83   Weight: 78.9 kg (174 lb)   Height: 165.1 cm (65\")      Physical " Medication Samples    Medication:Xarelto    Dosage of the medication:15 mg    How are you taking this medication (QD, BID, TID, QID, PRN):  Take one tablet daily    in the office or Mail to your home?      in office Exam   Constitutional: She is oriented to person, place, and time. She appears well-developed and well-nourished. No distress.   HENT:   Head: Normocephalic and atraumatic.   Eyes: EOM are normal. No scleral icterus.   Neck: Normal range of motion.   Cardiovascular: Normal rate and regular rhythm.   Pulmonary/Chest: Effort normal. No respiratory distress.   Abdominal: Soft. She exhibits no distension.   Musculoskeletal: Normal range of motion.   Neurological: She is alert and oriented to person, place, and time.   Skin: Skin is warm and dry. No rash noted. She is not diaphoretic. No erythema.   Psychiatric: She has a normal mood and affect. Her behavior is normal. Judgment and thought content normal.       ASSESSMENT/PLAN:     ICD-10-CM ICD-9-CM   1. Infertility, anovulation N97.0 628.0       I spent at least 15 minutes of 25 minute visit in face-to-face counseling on ovulation induction cycle.   Recommend repeat hcg in one week (serum) and if negative, will start OI again with Provera withdrawal.  May consider trial of day 5-10 cycle if third cycle is not successful. Pt in agreement.          Return in about 1 week (around 9/17/2020) for hcg lab, 6 weeks for office visit.

## 2023-03-10 ENCOUNTER — TELEPHONE (OUTPATIENT)
Dept: CARDIOLOGY CLINIC | Age: 88
End: 2023-03-10

## 2023-03-10 RX ORDER — METOPROLOL SUCCINATE 25 MG/1
TABLET, EXTENDED RELEASE ORAL
Qty: 90 TABLET | Refills: 3 | Status: SHIPPED | OUTPATIENT
Start: 2023-03-10

## 2023-03-10 NOTE — TELEPHONE ENCOUNTER
Kaylene's daughter called stating Selwyn Born has \"extreme\" swelling in calves and bilateral LE's. She states Kaylene's legs are red, numb, and she has pain in bilateral LE's. This also began after she did PT yesterday. Per Dr Shelia Burdick needs to be evaluated in ER. I relayed this to Kaylene's daughter. She verbalized understanding.

## 2023-03-16 ENCOUNTER — TELEPHONE (OUTPATIENT)
Dept: CARDIOLOGY CLINIC | Age: 88
End: 2023-03-16

## 2023-03-21 RX ORDER — ATORVASTATIN CALCIUM 40 MG/1
TABLET, FILM COATED ORAL
Qty: 90 TABLET | Refills: 2 | Status: SHIPPED | OUTPATIENT
Start: 2023-03-21

## 2023-03-30 ENCOUNTER — TELEPHONE (OUTPATIENT)
Dept: CARDIOLOGY CLINIC | Age: 88
End: 2023-03-30

## 2023-03-30 NOTE — TELEPHONE ENCOUNTER
Sample requested:   Xarelto     Strength: 15 mg    Dosage:  once daily    Patient's call back number:    [phone]    Pt usually gets 4 bottles

## 2023-03-31 NOTE — TELEPHONE ENCOUNTER
Pt daughter asking if Xarelto is available and if so when can they expect them to be ready for . Please advise.

## 2023-04-05 ENCOUNTER — OFFICE VISIT (OUTPATIENT)
Dept: CARDIOLOGY CLINIC | Age: 88
End: 2023-04-05
Payer: MEDICARE

## 2023-04-05 VITALS
WEIGHT: 149.4 LBS | BODY MASS INDEX: 31.36 KG/M2 | SYSTOLIC BLOOD PRESSURE: 100 MMHG | HEART RATE: 44 BPM | HEIGHT: 58 IN | DIASTOLIC BLOOD PRESSURE: 52 MMHG | OXYGEN SATURATION: 96 %

## 2023-04-05 DIAGNOSIS — E87.6 HYPOKALEMIA: ICD-10-CM

## 2023-04-05 DIAGNOSIS — E78.2 MIXED HYPERLIPIDEMIA: ICD-10-CM

## 2023-04-05 DIAGNOSIS — I10 ESSENTIAL HYPERTENSION: ICD-10-CM

## 2023-04-05 DIAGNOSIS — I25.10 CORONARY ARTERY DISEASE INVOLVING NATIVE CORONARY ARTERY OF NATIVE HEART WITHOUT ANGINA PECTORIS: Primary | ICD-10-CM

## 2023-04-05 DIAGNOSIS — R60.0 LOCALIZED EDEMA: ICD-10-CM

## 2023-04-05 PROCEDURE — G8427 DOCREV CUR MEDS BY ELIG CLIN: HCPCS | Performed by: NURSE PRACTITIONER

## 2023-04-05 PROCEDURE — 93000 ELECTROCARDIOGRAM COMPLETE: CPT | Performed by: NURSE PRACTITIONER

## 2023-04-05 PROCEDURE — 99214 OFFICE O/P EST MOD 30 MIN: CPT | Performed by: NURSE PRACTITIONER

## 2023-04-05 PROCEDURE — 1123F ACP DISCUSS/DSCN MKR DOCD: CPT | Performed by: NURSE PRACTITIONER

## 2023-04-05 PROCEDURE — 1036F TOBACCO NON-USER: CPT | Performed by: NURSE PRACTITIONER

## 2023-04-05 PROCEDURE — G8417 CALC BMI ABV UP PARAM F/U: HCPCS | Performed by: NURSE PRACTITIONER

## 2023-04-05 PROCEDURE — 1090F PRES/ABSN URINE INCON ASSESS: CPT | Performed by: NURSE PRACTITIONER

## 2023-04-05 RX ORDER — CHOLECALCIFEROL (VITAMIN D3) 125 MCG
500 CAPSULE ORAL DAILY
COMMUNITY

## 2023-04-05 RX ORDER — AMLODIPINE BESYLATE 5 MG/1
5 TABLET ORAL DAILY
COMMUNITY
Start: 2023-03-29 | End: 2023-04-05
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
## 2023-04-05 NOTE — PATIENT INSTRUCTIONS
Cut back on salter    Take dose of demadex today and Friday. Then return to every other day dosing.      Stop amlodipine     Get blood work checked today

## 2023-04-27 ENCOUNTER — TELEPHONE (OUTPATIENT)
Dept: CARDIOLOGY CLINIC | Age: 88
End: 2023-04-27

## 2023-04-27 NOTE — TELEPHONE ENCOUNTER
I spoke to pt daughter and let her know we are not in [place] and we will reach out to the [facility] office. I advised her to call us sooner so she does not run out of medication. I let her know someone will call if they have samples available.

## 2023-04-27 NOTE — TELEPHONE ENCOUNTER
Medication Samples    Medication:  rivaroxaban (XARELTO)    Dosage of the medication:  15 MG TABS tablet    How are you taking this medication (QD, BID, TID, QID, PRN):  Take 1 tablet by mouth Daily with supper     in the office or Mail to your home? Daughter will pick samples up for her mother and she states the pt is currently out of medication.  Please call Christiana [phone]

## 2023-05-03 ENCOUNTER — HOSPITAL ENCOUNTER (OUTPATIENT)
Age: 88
Discharge: HOME | End: 2023-05-03
Payer: MEDICARE

## 2023-05-03 ENCOUNTER — OFFICE VISIT (OUTPATIENT)
Dept: CARDIOLOGY CLINIC | Age: 88
End: 2023-05-03
Payer: MEDICARE

## 2023-05-03 VITALS
HEART RATE: 71 BPM | SYSTOLIC BLOOD PRESSURE: 132 MMHG | DIASTOLIC BLOOD PRESSURE: 66 MMHG | OXYGEN SATURATION: 98 % | HEIGHT: 58 IN | BODY MASS INDEX: 30.02 KG/M2 | WEIGHT: 143 LBS

## 2023-05-03 DIAGNOSIS — E43 UNSPECIFIED SEVERE PROTEIN-CALORIE MALNUTRITION (HCC): ICD-10-CM

## 2023-05-03 DIAGNOSIS — I25.10 CORONARY ARTERY DISEASE INVOLVING NATIVE CORONARY ARTERY OF NATIVE HEART WITHOUT ANGINA PECTORIS: ICD-10-CM

## 2023-05-03 DIAGNOSIS — R53.83 OTHER FATIGUE: ICD-10-CM

## 2023-05-03 DIAGNOSIS — E78.2 MIXED HYPERLIPIDEMIA: ICD-10-CM

## 2023-05-03 DIAGNOSIS — I10 ESSENTIAL HYPERTENSION: Primary | ICD-10-CM

## 2023-05-03 DIAGNOSIS — R60.0 LOCALIZED EDEMA: ICD-10-CM

## 2023-05-03 LAB
25(OH)D3 SERPL-MCNC: 40.5 NG/ML
TSH SERPL DL<=0.005 MIU/L-ACNC: 4.08 UIU/ML (ref 0.27–4.2)

## 2023-05-03 PROCEDURE — 82306 VITAMIN D 25 HYDROXY: CPT

## 2023-05-03 PROCEDURE — 84443 ASSAY THYROID STIM HORMONE: CPT

## 2023-05-03 PROCEDURE — 99214 OFFICE O/P EST MOD 30 MIN: CPT | Performed by: NURSE PRACTITIONER

## 2023-05-03 PROCEDURE — G8417 CALC BMI ABV UP PARAM F/U: HCPCS | Performed by: NURSE PRACTITIONER

## 2023-05-03 PROCEDURE — 1090F PRES/ABSN URINE INCON ASSESS: CPT | Performed by: NURSE PRACTITIONER

## 2023-05-03 PROCEDURE — G8427 DOCREV CUR MEDS BY ELIG CLIN: HCPCS | Performed by: NURSE PRACTITIONER

## 2023-05-03 PROCEDURE — 1123F ACP DISCUSS/DSCN MKR DOCD: CPT | Performed by: NURSE PRACTITIONER

## 2023-05-03 PROCEDURE — 1036F TOBACCO NON-USER: CPT | Performed by: NURSE PRACTITIONER

## 2023-05-03 PROCEDURE — 36415 COLL VENOUS BLD VENIPUNCTURE: CPT

## 2023-05-03 RX ORDER — POTASSIUM CHLORIDE 20 MEQ/1
20 TABLET, EXTENDED RELEASE ORAL DAILY
Qty: 90 TABLET | Refills: 1 | Status: SHIPPED | OUTPATIENT
Start: 2023-05-03

## 2023-05-03 NOTE — PATIENT INSTRUCTIONS
Take 50 mg of demadex every day through Sunday.  Then resume every other day dosing     Start taking potassium supplement when you take the demadex     Get blood work checked today

## 2023-05-03 NOTE — PROGRESS NOTES
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 . She previously complained chest tightness. Stress test revealed small-med apical fixed defect of moderate intensity consistent with infarction in distal LAD vs. Bowel. After consultation she decided on a LHC which was done by  Dr Argentina Bagley. 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 She has History includes hypertension and thyroid disease   Janeen Holman wears compression hose daily but does not have them on today. She has varicose veins. She has had cardiac evaluation in 2013 with a normal stress test and normal EF of 74%. Recent echo of 5/19  At [facility] was normal with possible mild diastolic dysfunction. She was admitted 5/23/22 for COVID infection. She was discharged on PO decadron. Demadex was stopped at discharge. Ms La Fan called in 10/20/22 with weeping BLE. Extra dose of diuretic given. Janeen Holman fell in her bathroom and went to the hospital on 12/20/22. She was admitted and underwent hemiarthroplasty 12/22/22 w/ Dr Soni Turner. She developed RLE popliteal DVT and was started on Xarelto. Today, Ms La Fan says that increasing diuretic LOV helped with swelling for a bit, but it has returned. She no longer eats salter daily, but has a cheat day once a week at a restaurant where she eats all of her favorite foods. Ms La Fan and her daughter are moving in to [place] 6/1 together and she has been busy packing and carrying boxes. She graduated from rehab but continues to do exercises at home.  Her biggest issue is fatigue and she is asking to have thyroid level

## 2023-05-25 ENCOUNTER — TELEPHONE (OUTPATIENT)
Dept: CARDIOLOGY CLINIC | Age: 88
End: 2023-05-25

## 2023-05-25 NOTE — TELEPHONE ENCOUNTER
3 bottles Xarelto 15mg LOT# [id number] exp 7/24 for patient's daughter to . Christiana has been notified and she will  this afternoon.

## 2023-05-25 NOTE — TELEPHONE ENCOUNTER
Medication Samples    Medication: XARELTO    Dosage of the medication:  15mg    How are you taking this medication (QD, BID, TID, QID, PRN):  Take 1 tablet by mouth Daily with supper     in the office or Mail to your home?      in the office - Please call when ready  Per Christiana, daughter she will go to any office for the samples

## 2023-07-11 ENCOUNTER — TELEPHONE (OUTPATIENT)
Dept: CARDIOLOGY CLINIC | Age: 88
End: 2023-07-11

## 2023-07-11 NOTE — TELEPHONE ENCOUNTER
Pt called stating they are having spells where they are get hot and sick to their stomach in the afternoons, pt stated during the spells they have chest pain across the breast and it feels like they have a tight bra on, in which they don't. Pt also stated they having seepage with their right legs and have to put a towel on it, and it get wet pretty fast and has to change it frequent. Pt was tearful stating they are falling apart.     Pls advise thank you

## 2023-07-11 NOTE — TELEPHONE ENCOUNTER
Attempted to call Debbi Escobedo 2 times. VM not set up. Will attempt to call patient again tomorrow.

## 2023-07-12 NOTE — TELEPHONE ENCOUNTER
Called patient again, no answer. Unable to leave . Also tried calling patient's daughter, Nel Cruz, but her mailbox was full and I was unable to leave .

## 2023-07-19 NOTE — PROGRESS NOTES
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 . She previously complained chest tightness. Stress test revealed small-med apical fixed defect of moderate intensity consistent with infarction in distal LAD vs. Bowel. After consultation she decided on a LHC which was done by  Dr Guzman Maher. 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 She has History includes hypertension and thyroid disease   Judy Villatoro wears compression hose daily but does not have them on today. She has varicose veins. She has had cardiac evaluation in 2013 with a normal stress test and normal EF of 74%. Recent echo of 5/19  [facility] was normal with possible mild diastolic dysfunction. She was admitted 5/23/22 for COVID infection. She was discharged on PO decadron. Demadex was stopped at discharge. Ms Redge Gordon called in 10/20/22 with weeping BLE. Extra dose of diuretic given. Judy Villatoro fell in her bathroom and went to the hospital on 12/20/22. She was admitted and underwent hemiarthroplasty 12/22/22 w/ Dr Rohit Mcdowell. She developed RLE popliteal DVT and was started on Xarelto. She was hospitalized 7/11-7/13/23 for acute dCHF. Repeat echo was done. She was treated for RLE cellulitis and Bumex changed to Torsemide 50mg daily. Dr Zack Rahman discontinued Xarelto at [facility] 6/13/23. 5/28/23 she fell in her backyard and went to ER. Judy Villatoro is here with her daughter. Judy Villatoro is living alone in her home, her daughter lives at [place]. She states she sleeps ok. She denies chest pain or palpitations. Kaylene's LE's are edematous and red.  She has dizzy spells, gets

## 2023-08-01 ENCOUNTER — OFFICE VISIT (OUTPATIENT)
Dept: CARDIOLOGY CLINIC | Age: 88
End: 2023-08-01
Payer: MEDICARE

## 2023-08-01 VITALS
HEART RATE: 66 BPM | DIASTOLIC BLOOD PRESSURE: 54 MMHG | HEIGHT: 58 IN | OXYGEN SATURATION: 97 % | BODY MASS INDEX: 29.18 KG/M2 | SYSTOLIC BLOOD PRESSURE: 110 MMHG | WEIGHT: 139 LBS

## 2023-08-01 DIAGNOSIS — E78.2 MIXED HYPERLIPIDEMIA: ICD-10-CM

## 2023-08-01 DIAGNOSIS — I82.4Z9 DEEP VEIN THROMBOSIS (DVT) OF DISTAL VEIN OF LOWER EXTREMITY, UNSPECIFIED CHRONICITY, UNSPECIFIED LATERALITY (HCC): ICD-10-CM

## 2023-08-01 DIAGNOSIS — I25.10 CORONARY ARTERY DISEASE INVOLVING NATIVE CORONARY ARTERY OF NATIVE HEART WITHOUT ANGINA PECTORIS: Primary | ICD-10-CM

## 2023-08-01 DIAGNOSIS — R60.0 LOCALIZED EDEMA: ICD-10-CM

## 2023-08-01 DIAGNOSIS — I10 ESSENTIAL HYPERTENSION: ICD-10-CM

## 2023-08-01 PROCEDURE — G8417 CALC BMI ABV UP PARAM F/U: HCPCS | Performed by: INTERNAL MEDICINE

## 2023-08-01 PROCEDURE — 1090F PRES/ABSN URINE INCON ASSESS: CPT | Performed by: INTERNAL MEDICINE

## 2023-08-01 PROCEDURE — G8427 DOCREV CUR MEDS BY ELIG CLIN: HCPCS | Performed by: INTERNAL MEDICINE

## 2023-08-01 PROCEDURE — 1123F ACP DISCUSS/DSCN MKR DOCD: CPT | Performed by: INTERNAL MEDICINE

## 2023-08-01 PROCEDURE — 99214 OFFICE O/P EST MOD 30 MIN: CPT | Performed by: INTERNAL MEDICINE

## 2023-08-01 PROCEDURE — 1036F TOBACCO NON-USER: CPT | Performed by: INTERNAL MEDICINE

## 2023-08-01 RX ORDER — ISOSORBIDE MONONITRATE 30 MG/1
30 TABLET, EXTENDED RELEASE ORAL DAILY
Qty: 30 TABLET | Refills: 6 | Status: SHIPPED | OUTPATIENT
Start: 2023-08-01

## 2023-08-15 RX ORDER — ISOSORBIDE MONONITRATE 30 MG/1
30 TABLET, EXTENDED RELEASE ORAL DAILY
Qty: 90 TABLET | Refills: 3 | Status: SHIPPED | OUTPATIENT
Start: 2023-08-15

## 2023-08-15 NOTE — TELEPHONE ENCOUNTER
Refill was requested from pharmacy for a 90 day supply Patient is up to date with appointments and lab work.

## 2023-08-16 ENCOUNTER — TELEPHONE (OUTPATIENT)
Dept: CARDIOLOGY CLINIC | Age: 88
End: 2023-08-16

## 2023-08-16 DIAGNOSIS — I10 ESSENTIAL HYPERTENSION: Primary | ICD-10-CM

## 2023-08-16 RX ORDER — TORSEMIDE 100 MG/1
50 TABLET ORAL DAILY
Qty: 45 TABLET | Refills: 1 | Status: SHIPPED | OUTPATIENT
Start: 2023-08-16

## 2023-08-16 NOTE — TELEPHONE ENCOUNTER
Medication Refill    Medication needing refilled:torsemide (DEMADEX) 100 MG tablet      Dosage of the medication: 1 tablet daily, changed from 1/2. How are you taking this medication (QD, BID, TID, QID, PRN): once daily    30 or 90 day supply called in: 90 tablets    When will you run out of your medication: 1 week    Which Pharmacy are we sending the medication to?:    [facility]/pharmacy [address] [place] [address], [address] [address]   Phone:  [phone]  Fax:  [phone]    Please call patient regarding isosorbide. Patient would like to know what the pill is for.   ryne

## 2023-08-16 NOTE — TELEPHONE ENCOUNTER
I called Lucila Armando and explained to her that Isosorbide is for chest pain. She states she has been taking Torsemide 50mg daily since hospitalization due to the swelling in her legs. She states the swelling is so bad and she has cut out the salt in her diet. Ok to fill Torsemide 50mg QD?  She was taking Torsemide 50mg QOD prior to hospitalization in July

## 2023-09-11 RX ORDER — TORSEMIDE 100 MG/1
TABLET ORAL
Qty: 90 TABLET | Refills: 1 | Status: SHIPPED | OUTPATIENT
Start: 2023-09-11

## 2024-01-15 NOTE — PROGRESS NOTES
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 She previously complained chest tightness. Stress test revealed small-med apical fixed defect of moderate intensity consistent with infarction in distal LAD vs. Bowel. After consultation she decided on a LHC which was done by  Dr Galarza. 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    She has History includes hypertension and thyroid disease   Kaylene wears compression hose daily but does not have them on today. She has varicose veins. She has had cardiac evaluation in 2013 with a normal stress test and normal EF of 74%.  Recent echo of 5/19  At [facility] was normal with possible mild diastolic dysfunction.     She was admitted 5/23/22 for COVID infection. She was discharged on PO decadron. Demadex was stopped at discharge.    Ms Woods called in 10/20/22 with weeping BLE. Extra dose of diuretic given.      Kaylene fell in her bathroom and went to the hospital on 12/20/22. She was admitted and underwent hemiarthroplasty 12/22/22 w/ Dr Villagomez. She developed RLE popliteal DVT and was started on Xarelto.     She was hospitalized 7/11-7/13/23 for acute dCHF. Repeat echo was done. She was treated for RLE cellulitis and Bumex changed to Torsemide 50mg daily. Dr Ambrosio discontinued Xarelto at [facility] 6/13/23. 5/28/23 she fell in her backyard and went to ER.      She had Covid in December, 2023 and was hospitalized at [facility]. She states she was \"pretty sick\" with Covid. She also had cellulitis on her arms and legs. Her daughter states Kaylene's legs are red and swollen. Kaylene is living alone in

## 2024-01-17 ENCOUNTER — OFFICE VISIT (OUTPATIENT)
Dept: CARDIOLOGY CLINIC | Age: 89
End: 2024-01-17

## 2024-01-17 VITALS
OXYGEN SATURATION: 96 % | BODY MASS INDEX: 30.44 KG/M2 | HEART RATE: 87 BPM | SYSTOLIC BLOOD PRESSURE: 110 MMHG | HEIGHT: 58 IN | WEIGHT: 145 LBS | DIASTOLIC BLOOD PRESSURE: 54 MMHG

## 2024-01-17 DIAGNOSIS — I82.4Z9 DEEP VEIN THROMBOSIS (DVT) OF DISTAL VEIN OF LOWER EXTREMITY, UNSPECIFIED CHRONICITY, UNSPECIFIED LATERALITY (HCC): ICD-10-CM

## 2024-01-17 DIAGNOSIS — I25.10 CORONARY ARTERY DISEASE INVOLVING NATIVE CORONARY ARTERY OF NATIVE HEART WITHOUT ANGINA PECTORIS: Primary | ICD-10-CM

## 2024-01-17 DIAGNOSIS — E78.2 MIXED HYPERLIPIDEMIA: ICD-10-CM

## 2024-01-17 DIAGNOSIS — I10 ESSENTIAL HYPERTENSION: ICD-10-CM

## 2024-01-17 RX ORDER — SPIRONOLACTONE 25 MG/1
12.5 TABLET ORAL DAILY
Qty: 30 TABLET | Refills: 5 | Status: SHIPPED | OUTPATIENT
Start: 2024-01-17

## 2024-01-17 RX ORDER — IBUPROFEN 200 MG
200 TABLET ORAL EVERY 6 HOURS PRN
COMMUNITY

## 2024-01-17 RX ORDER — ZINC GLUCONATE 50 MG
50 TABLET ORAL DAILY
COMMUNITY

## 2024-04-09 RX ORDER — METOPROLOL SUCCINATE 25 MG/1
TABLET, EXTENDED RELEASE ORAL
Qty: 90 TABLET | Refills: 0 | Status: SHIPPED | OUTPATIENT
Start: 2024-04-09

## 2024-04-09 RX ORDER — CLOPIDOGREL BISULFATE 75 MG/1
TABLET ORAL
Qty: 90 TABLET | Refills: 0 | Status: SHIPPED | OUTPATIENT
Start: 2024-04-09

## 2024-04-09 NOTE — TELEPHONE ENCOUNTER
Spoke w/ Kaylene regarding BMP that Dr Montemayor ordered in January. She states she forgot to get the labs drawn and will go to [facility] this week. Lab orders have been faxed. Rx's e-prescribed

## 2024-04-10 NOTE — PROGRESS NOTES
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  Stress test was done after complaints of chest pain.  It revealed small-med apical fixed defect of moderate intensity consistent with infarction in distal LAD vs. Bowel. After consultation she decided on a LHC which was done by  Dr Galarza on 11/10/20. 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   Other history includes hypertension and thyroid disease   She has varicose veins.      Kaylene fell in her bathroom and went to the hospital on 12/20/22. She was admitted and underwent hemiarthroplasty 12/22/22 w/ Dr Villagomez. She developed RLE popliteal DVT and was started on Xarelto.     She was hospitalized 7/11-7/13/23 for acute dCHF. Repeat echo was done. She was treated for RLE cellulitis and Bumex changed to Torsemide 50mg QOD. Dr Ambrosio discontinued Xarelto at [facility] 6/13/23. 5/28/23 she fell in her backyard and went to ER.     Today, Kaylene states she is doing ok. She denies chest pain, palpitations, dizziness. She is planning on moving to assisted living and is packing up her house. She lives in the house that her parents lived in. She states her legs are sore and swollen off and on.       Past Medical History:   has a past medical history of Arthritis, Hypertension, Muscle cramps at night, and Thyroid disease. Mild renal insuff.     Surgical History:   has a past surgical history that includes Appendectomy; Mandible surgery; Hemorrhoid surgery; Tubal ligation; and Carpal tunnel release (5-24-12).     Current Outpatient Medications   Medication Sig Dispense Refill    ferrous sulfate

## 2024-04-11 LAB
ANION GAP SERPL CALCULATED.3IONS-SCNC: 11 MMOL/L (ref 7–16)
BUN BLDV-MCNC: 26 MG/DL (ref 7–25)
CALCIUM SERPL-MCNC: 9.3 MG/DL (ref 8.6–10.2)
CHLORIDE BLD-SCNC: 102 MMOL/L (ref 98–107)
CO2: 30 MMOL/L (ref 21–31)
CREAT SERPL-MCNC: 1.57 MG/DL (ref 0.6–1.2)
EGFR FEMALE: 31 ML/MIN/1.73M2
GLUCOSE BLD-MCNC: 93 MG/DL (ref 74–109)
POTASSIUM SERPL-SCNC: 3.5 MMOL/L (ref 3.5–5.1)
SODIUM BLD-SCNC: 143 MMOL/L (ref 136–145)

## 2024-04-19 RX ORDER — SPIRONOLACTONE 25 MG/1
12.5 TABLET ORAL EVERY OTHER DAY
Qty: 30 TABLET | Refills: 5
Start: 2024-04-19

## 2024-04-24 ENCOUNTER — OFFICE VISIT (OUTPATIENT)
Dept: CARDIOLOGY CLINIC | Age: 89
End: 2024-04-24
Payer: MEDICARE

## 2024-04-24 VITALS
HEART RATE: 83 BPM | BODY MASS INDEX: 29.81 KG/M2 | DIASTOLIC BLOOD PRESSURE: 58 MMHG | OXYGEN SATURATION: 96 % | WEIGHT: 142 LBS | HEIGHT: 58 IN | SYSTOLIC BLOOD PRESSURE: 118 MMHG

## 2024-04-24 DIAGNOSIS — E78.2 MIXED HYPERLIPIDEMIA: ICD-10-CM

## 2024-04-24 DIAGNOSIS — I10 ESSENTIAL HYPERTENSION: ICD-10-CM

## 2024-04-24 DIAGNOSIS — I25.10 CORONARY ARTERY DISEASE INVOLVING NATIVE CORONARY ARTERY OF NATIVE HEART WITHOUT ANGINA PECTORIS: Primary | ICD-10-CM

## 2024-04-24 DIAGNOSIS — I82.4Z9 DEEP VEIN THROMBOSIS (DVT) OF DISTAL VEIN OF LOWER EXTREMITY, UNSPECIFIED CHRONICITY, UNSPECIFIED LATERALITY (HCC): ICD-10-CM

## 2024-04-24 PROCEDURE — 1036F TOBACCO NON-USER: CPT | Performed by: INTERNAL MEDICINE

## 2024-04-24 PROCEDURE — 1090F PRES/ABSN URINE INCON ASSESS: CPT | Performed by: INTERNAL MEDICINE

## 2024-04-24 PROCEDURE — 99214 OFFICE O/P EST MOD 30 MIN: CPT | Performed by: INTERNAL MEDICINE

## 2024-04-24 PROCEDURE — G8427 DOCREV CUR MEDS BY ELIG CLIN: HCPCS | Performed by: INTERNAL MEDICINE

## 2024-04-24 PROCEDURE — G8417 CALC BMI ABV UP PARAM F/U: HCPCS | Performed by: INTERNAL MEDICINE

## 2024-04-24 PROCEDURE — 1123F ACP DISCUSS/DSCN MKR DOCD: CPT | Performed by: INTERNAL MEDICINE

## 2024-04-24 RX ORDER — ATORVASTATIN CALCIUM 40 MG/1
40 TABLET, FILM COATED ORAL NIGHTLY
Qty: 90 TABLET | Refills: 3 | Status: SHIPPED | OUTPATIENT
Start: 2024-04-24

## 2024-04-24 RX ORDER — ALBUTEROL SULFATE 90 UG/1
2 AEROSOL, METERED RESPIRATORY (INHALATION) EVERY 6 HOURS PRN
COMMUNITY
Start: 2024-02-08

## 2024-04-24 RX ORDER — FERROUS SULFATE 325(65) MG
325 TABLET ORAL
COMMUNITY

## 2024-07-16 NOTE — PROGRESS NOTES
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  Stress test was done after complaints of chest pain.  It revealed small-med apical fixed defect of moderate intensity consistent with infarction in distal LAD vs. Bowel. After consultation she decided on a LHC which was done by  Dr Galarza on 11/10/20. 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   Other history includes hypertension and thyroid disease   She has varicose veins.      Kaylene fell in her bathroom and went to the hospital on 12/20/22. She was admitted and underwent hemiarthroplasty 12/22/22 w/ Dr Villagomez. She developed RLE popliteal DVT and was started on Xarelto.     She was hospitalized 7/11-7/13/23 for acute dCHF. Repeat echo was done. She was treated for RLE cellulitis and Bumex changed to Torsemide 50mg QOD. Dr Ambrosio discontinued Xarelto at [facility] 6/13/23. 5/28/23 she fell in her backyard and went to ER.     Today, Kaylene is here with her daughter. She continues to have LE edema and her LE's hurt. She did not get labs drawn prior to today's visit. Kaylene denies chest pain, palpitations, or dizziness. She states her breathing is ok. She walks with a cane.       Past Medical History:   has a past medical history of Arthritis, Hypertension, Muscle cramps at night, and Thyroid disease. Mild renal insuff.     Surgical History:   has a past surgical history that includes Appendectomy; Mandible surgery; Hemorrhoid surgery; Tubal ligation; and Carpal tunnel release (5-24-12).     Current Outpatient Medications   Medication Sig Dispense Refill    ferrous sulfate (IRON 325)

## 2024-07-31 ENCOUNTER — OFFICE VISIT (OUTPATIENT)
Dept: CARDIOLOGY CLINIC | Age: 89
End: 2024-07-31
Payer: MEDICARE

## 2024-07-31 VITALS
WEIGHT: 135 LBS | DIASTOLIC BLOOD PRESSURE: 70 MMHG | HEART RATE: 77 BPM | BODY MASS INDEX: 28.34 KG/M2 | SYSTOLIC BLOOD PRESSURE: 142 MMHG | HEIGHT: 58 IN | OXYGEN SATURATION: 98 %

## 2024-07-31 DIAGNOSIS — E78.2 MIXED HYPERLIPIDEMIA: ICD-10-CM

## 2024-07-31 DIAGNOSIS — R60.0 LOCALIZED EDEMA: ICD-10-CM

## 2024-07-31 DIAGNOSIS — I25.10 CORONARY ARTERY DISEASE INVOLVING NATIVE CORONARY ARTERY OF NATIVE HEART WITHOUT ANGINA PECTORIS: Primary | ICD-10-CM

## 2024-07-31 DIAGNOSIS — I10 ESSENTIAL HYPERTENSION: ICD-10-CM

## 2024-07-31 PROCEDURE — 1123F ACP DISCUSS/DSCN MKR DOCD: CPT | Performed by: INTERNAL MEDICINE

## 2024-07-31 PROCEDURE — G8427 DOCREV CUR MEDS BY ELIG CLIN: HCPCS | Performed by: INTERNAL MEDICINE

## 2024-07-31 PROCEDURE — 99214 OFFICE O/P EST MOD 30 MIN: CPT | Performed by: INTERNAL MEDICINE

## 2024-07-31 PROCEDURE — 1090F PRES/ABSN URINE INCON ASSESS: CPT | Performed by: INTERNAL MEDICINE

## 2024-07-31 PROCEDURE — 93000 ELECTROCARDIOGRAM COMPLETE: CPT | Performed by: INTERNAL MEDICINE

## 2024-07-31 PROCEDURE — G8417 CALC BMI ABV UP PARAM F/U: HCPCS | Performed by: INTERNAL MEDICINE

## 2024-07-31 PROCEDURE — 1036F TOBACCO NON-USER: CPT | Performed by: INTERNAL MEDICINE

## 2024-08-01 ENCOUNTER — TELEPHONE (OUTPATIENT)
Dept: CARDIOLOGY CLINIC | Age: 89
End: 2024-08-01

## 2024-08-01 LAB
A/G RATIO: 1.2 (ref 1–2)
ALBUMIN: 3.8 G/DL (ref 3.5–5.7)
ALP BLD-CCNC: 97 U/L (ref 34–104)
ALT SERPL-CCNC: 9 U/L (ref 7–52)
ANION GAP SERPL CALCULATED.3IONS-SCNC: 8 MMOL/L (ref 7–16)
AST SERPL-CCNC: 15 U/L (ref 13–39)
BILIRUB SERPL-MCNC: 0.5 MG/DL (ref 0.3–1)
BUN BLDV-MCNC: 22 MG/DL (ref 7–25)
CALCIUM SERPL-MCNC: 9.4 MG/DL (ref 8.6–10.2)
CHLORIDE BLD-SCNC: 105 MMOL/L (ref 98–107)
CHOLESTEROL, TOTAL: 159 MG/DL
CO2: 28 MMOL/L (ref 21–31)
CREAT SERPL-MCNC: 1.46 MG/DL (ref 0.6–1.2)
EGFR FEMALE: 34 ML/MIN/1.73M2
GLOBULIN: 3.1 G/DL (ref 2.6–4.2)
GLUCOSE BLD-MCNC: 92 MG/DL (ref 74–109)
HDLC SERPL-MCNC: 38 MG/DL (ref 40–60)
LDL CHOLESTEROL: 97 MG/DL (ref 0–99)
POTASSIUM SERPL-SCNC: 4 MMOL/L (ref 3.5–5.1)
SODIUM BLD-SCNC: 141 MMOL/L (ref 136–145)
TOTAL PROTEIN: 6.9 G/DL (ref 6–8.3)
TRIGL SERPL-MCNC: 119 MG/DL
VLDLC SERPL CALC-MCNC: 24 MG/DL (ref 0–40)

## 2024-08-01 RX ORDER — TORSEMIDE 100 MG/1
TABLET ORAL
Qty: 90 TABLET | Refills: 1
Start: 2024-08-01

## 2024-08-01 NOTE — TELEPHONE ENCOUNTER
Dr Montemayor received labs on patient. Labs 8/1/24: BUN/creat 22/1.46, K+ 4.0. per Dr Montemayor, increase Torsemide 50mg M-F, do not take on Sat or Sun. I called Kaylene and went over instructions with her at length. She wrote medication change down and repeated back to me.

## 2024-08-15 ENCOUNTER — TELEPHONE (OUTPATIENT)
Dept: CARDIOLOGY CLINIC | Age: 89
End: 2024-08-15

## 2024-08-15 DIAGNOSIS — I25.10 CORONARY ARTERY DISEASE INVOLVING NATIVE CORONARY ARTERY OF NATIVE HEART WITHOUT ANGINA PECTORIS: Primary | ICD-10-CM

## 2024-08-15 LAB
BASOPHILS ABSOLUTE: 0.1 K/UL (ref 0–0.1)
BASOPHILS RELATIVE PERCENT: 0.8 %
EOSINOPHILS ABSOLUTE: 0.4 K/UL (ref 0–0.4)
EOSINOPHILS RELATIVE PERCENT: 5.2 %
HCT VFR BLD CALC: 30.5 % (ref 35.8–46.5)
HEMOGLOBIN: 10.3 G/DL (ref 12.1–15.8)
LYMPHOCYTES ABSOLUTE: 1.3 K/UL (ref 0.8–3.6)
LYMPHOCYTES RELATIVE PERCENT: 18.4 %
MCH RBC QN AUTO: 30 PG (ref 28.4–33.4)
MCHC RBC AUTO-ENTMCNC: 33.9 G/DL (ref 31.1–37)
MCV RBC AUTO: 88.7 FL (ref 85–99)
MONOCYTES ABSOLUTE: 0.6 K/UL (ref 0.3–0.9)
MONOCYTES RELATIVE PERCENT: 8.1 %
NEUTROPHILS ABSOLUTE: 4.7 K/UL (ref 2–7.3)
NEUTROPHILS RELATIVE PERCENT: 67.5 %
PDW BLD-RTO: 14.5 % (ref 11.7–15.2)
PLATELET # BLD: 273 K/UL (ref 154–393)
RBC # BLD: 3.44 M/UL (ref 3.86–5.17)
WBC # BLD: 7 K/UL (ref 4–10.5)

## 2024-08-15 RX ORDER — CLOPIDOGREL BISULFATE 75 MG/1
TABLET ORAL
Qty: 90 TABLET | Refills: 3 | Status: SHIPPED | OUTPATIENT
Start: 2024-08-15

## 2024-08-15 NOTE — TELEPHONE ENCOUNTER
Plavix e-prescribed to pharmacy. Please call Kaylene and let her know Dr Montemayor wants her to repeat CBC. Orders placed

## 2024-08-23 NOTE — TELEPHONE ENCOUNTER
Pt called to speak with someone in the office regarding her swelling in both feet.   Please advise
Pt is still having issues with her legs. Her left leg is burning and she states are toes are \"dark\". Dr. Amandeep Kennedy had ordered a lower extremity arterial doppler. I advised her to call them back and report her symptoms per Dr. Madison Bond.
61.7

## 2024-09-19 RX ORDER — ISOSORBIDE MONONITRATE 30 MG/1
30 TABLET, EXTENDED RELEASE ORAL DAILY
Qty: 90 TABLET | Refills: 3 | Status: SHIPPED | OUTPATIENT
Start: 2024-09-19

## 2024-09-20 ENCOUNTER — TELEPHONE (OUTPATIENT)
Dept: CARDIOLOGY CLINIC | Age: 89
End: 2024-09-20

## 2024-09-20 NOTE — TELEPHONE ENCOUNTER
I spoke with Christiana her daughter and verified her diuretics and dosing. Her legs started seeping yesterday. She does not have a current weight. She is not any more SOB than usual.     Per Dr. Montemayor increase Torsemide to 1/2 bid today, Sat and Sun and call us on Monday to report how she is doing. Recent labs were reviewed with [facility] no new orders yet. I spoke to Christiana and gave instructions. I advised her to weigh today and also on Monday morning. We also discussed Kaylene keeping a low sodium diet.

## 2024-09-20 NOTE — TELEPHONE ENCOUNTER
Pt's daughter states that pt's legs are seeping and tight she is uncomfortable.  She is having pain walking, and getting in an out of the car was very uncomfortable.    Please advise

## 2024-09-25 NOTE — TELEPHONE ENCOUNTER
I called pt's daughter to see how Kaylene is doing. She stated she is in a meeting and will call back. I also called Kaylene and JEROME for her to call back.

## 2024-09-25 NOTE — TELEPHONE ENCOUNTER
Per Dr Montemayor, wrap legs with ace wraps, elevate legs and limit salt intake. Miriam spoke w/ her daughter and provided instructions. Christiana also asked about Kaylene getting injections in her knee and if ok to continue, she will be seeing [facility] in the next couple days and will ask the orthopedic.

## 2024-10-16 RX ORDER — METOPROLOL SUCCINATE 25 MG/1
25 TABLET, EXTENDED RELEASE ORAL DAILY
Qty: 90 TABLET | Refills: 3 | Status: SHIPPED | OUTPATIENT
Start: 2024-10-16

## 2024-10-16 NOTE — TELEPHONE ENCOUNTER
Refill request received for Toprol XL 25mg 1 po daily from [facility]. Patient saw Dr Montemayor on 7/31/24. Medication e-prescribed.

## 2024-11-12 RX ORDER — TORSEMIDE 100 MG/1
TABLET ORAL
Qty: 30 TABLET | Refills: 3 | Status: SHIPPED | OUTPATIENT
Start: 2024-11-12

## 2024-11-12 RX ORDER — SPIRONOLACTONE 25 MG/1
12.5 TABLET ORAL DAILY
Qty: 45 TABLET | Refills: 3 | Status: SHIPPED | OUTPATIENT
Start: 2024-11-12

## 2024-12-17 ENCOUNTER — TELEPHONE (OUTPATIENT)
Dept: CARDIOLOGY CLINIC | Age: 88
End: 2024-12-17

## 2024-12-17 NOTE — TELEPHONE ENCOUNTER
Augustinegemma, daughter called stating that the Pt states that she re: a call re: a appt at 1:00pm.  Please call to discuss the returned call.  Thank you

## 2025-03-17 ENCOUNTER — TELEPHONE (OUTPATIENT)
Dept: CARDIOLOGY CLINIC | Age: 89
End: 2025-03-17

## 2025-03-17 DIAGNOSIS — R42 DIZZINESS: ICD-10-CM

## 2025-03-17 DIAGNOSIS — I10 ESSENTIAL HYPERTENSION: ICD-10-CM

## 2025-03-17 DIAGNOSIS — I25.10 CORONARY ARTERY DISEASE INVOLVING NATIVE CORONARY ARTERY OF NATIVE HEART WITHOUT ANGINA PECTORIS: ICD-10-CM

## 2025-03-17 DIAGNOSIS — R60.0 LOCALIZED EDEMA: Primary | ICD-10-CM

## 2025-03-17 NOTE — TELEPHONE ENCOUNTER
Rebekah states pt was prescribed furosemide while in [facility] 3/4 and also takes Torsemide. Rebekah asking if pt should still be taking atorvastatin also. Rebekah asking if ok that pt is taking both those medications. Please advise Rebekah.

## 2025-03-17 NOTE — TELEPHONE ENCOUNTER
I called and spoke to Rebekah.  States that she is trying to figure out what medications that Kaylene should be taking since discharge from the hospital. States that she believes that Kaylene has been taking Spironolactone 25 mg daily, Lasix 40 mg daily and Torsemide 50 mg daily unless and she is going to be leaving the house then she will not take them.  Rebekah reports that she has not had any blood work done since discharge.  She is able to draw labs if a verbal order and/or lab orders are faxed to them.  She is requesting that I call and talk to Christiana more about how/what Kaylene has been taking since she is the one that kind of oversees her medications.      I called and spoke to Christiana.  Kaylene is taking Spironolactone 12.5 mg daily, Torsemide 50 mg daily.  Christiana states that with her last hospitalization she was started on Furosemide 40 mg daily for about 10 days now in addition to the Spironolactone and Torsemide.  Christiana reports that Kaylene does have dizziness and lightheadedness.  Blood pressures was 120/61 or 81 today.  Reports that her weight is now 120 pounds.  She does have swelling to both lower legs but is now following [facility] for a wound on her right shin.  States that both legs get wrapped once a week.  I informed her that someone would be reaching out to her either later today or tomorrow with an update on how to proceed.  She verbalized understanding and denied any further questions and/or concerns.

## 2025-03-17 NOTE — TELEPHONE ENCOUNTER
Discussed with MALKA.  BMP to be drawn and she needs a hospital follow up appointment with LAY on Wednesday.      BMP order placed.      I called and provided a verbal order to CHE Welch and provided a verbal order for a BMP to be drawn today or tomorrow.  Order read back verified.  She verbalized understanding and denied any questions and/or concerns.      I called and spoke to Christiana.  Hospital follow up appointment scheduled with [facility] on 3/19/25 at 3P at the [place] [facility].  She is agreeable to this day, time and location and is aware of the blood work prior to the office visit.  She verbalized understanding and denied any further questions and/or concerns.

## 2025-03-18 ENCOUNTER — RESULTS FOLLOW-UP (OUTPATIENT)
Dept: CARDIOLOGY CLINIC | Age: 89
End: 2025-03-18

## 2025-03-18 LAB
ANION GAP SERPL CALCULATED.3IONS-SCNC: 13 MMOL/L (ref 7–16)
BASOPHILS ABSOLUTE: 0 K/UL (ref 0–0.1)
BASOPHILS RELATIVE PERCENT: 0.6 %
BUN BLDV-MCNC: 46 MG/DL (ref 7–25)
CALCIUM SERPL-MCNC: 9 MG/DL (ref 8.6–10.2)
CHLORIDE BLD-SCNC: 99 MMOL/L (ref 98–107)
CO2: 26 MMOL/L (ref 21–31)
CREAT SERPL-MCNC: 1.69 MG/DL (ref 0.6–1.2)
EGFR FEMALE: 29 ML/MIN/1.73M2
EOSINOPHILS ABSOLUTE: 0.2 K/UL (ref 0–0.4)
EOSINOPHILS RELATIVE PERCENT: 2.5 %
GLUCOSE BLD-MCNC: 107 MG/DL (ref 74–109)
HCT VFR BLD CALC: 33.9 % (ref 35.8–46.5)
HEMOGLOBIN: 11.2 G/DL (ref 12.1–15.8)
LYMPHOCYTES ABSOLUTE: 1 K/UL (ref 0.8–3.6)
LYMPHOCYTES RELATIVE PERCENT: 16.2 %
MCH RBC QN AUTO: 28.8 PG (ref 28.4–33.4)
MCHC RBC AUTO-ENTMCNC: 33.1 G/DL (ref 31.1–37)
MCV RBC AUTO: 86.9 FL (ref 85–99)
MONOCYTES ABSOLUTE: 0.4 K/UL (ref 0.3–0.9)
MONOCYTES RELATIVE PERCENT: 7.4 %
NEUTROPHILS ABSOLUTE: 4.4 K/UL (ref 2–7.3)
NEUTROPHILS RELATIVE PERCENT: 73.3 %
PDW BLD-RTO: 15.7 % (ref 11.7–15.2)
PLATELET # BLD: 285 K/UL (ref 154–393)
POTASSIUM SERPL-SCNC: 3.9 MMOL/L (ref 3.5–5.1)
RBC # BLD: 3.91 M/UL (ref 3.86–5.17)
SODIUM BLD-SCNC: 138 MMOL/L (ref 136–145)
WBC # BLD: 6 K/UL (ref 4–10.5)

## 2025-03-18 NOTE — TELEPHONE ENCOUNTER
Helio called back today asking if she can also draw a CBC while with the pt. I let her know this is ok.

## 2025-03-19 ENCOUNTER — OFFICE VISIT (OUTPATIENT)
Dept: CARDIOLOGY CLINIC | Age: 89
End: 2025-03-19

## 2025-03-19 VITALS
WEIGHT: 123.6 LBS | BODY MASS INDEX: 25.94 KG/M2 | DIASTOLIC BLOOD PRESSURE: 58 MMHG | OXYGEN SATURATION: 97 % | HEART RATE: 80 BPM | SYSTOLIC BLOOD PRESSURE: 122 MMHG | HEIGHT: 58 IN

## 2025-03-19 DIAGNOSIS — E78.2 MIXED HYPERLIPIDEMIA: ICD-10-CM

## 2025-03-19 DIAGNOSIS — R60.0 LOCALIZED EDEMA: Primary | ICD-10-CM

## 2025-03-19 DIAGNOSIS — I10 ESSENTIAL HYPERTENSION: ICD-10-CM

## 2025-03-19 DIAGNOSIS — I25.10 CORONARY ARTERY DISEASE INVOLVING NATIVE CORONARY ARTERY OF NATIVE HEART WITHOUT ANGINA PECTORIS: ICD-10-CM

## 2025-03-19 RX ORDER — POTASSIUM CHLORIDE 1500 MG/1
20 TABLET, EXTENDED RELEASE ORAL DAILY
COMMUNITY
Start: 2024-12-13 | End: 2025-03-19

## 2025-03-19 RX ORDER — ATORVASTATIN CALCIUM 40 MG/1
40 TABLET, FILM COATED ORAL NIGHTLY
Qty: 90 TABLET | Refills: 3 | Status: SHIPPED | OUTPATIENT
Start: 2025-03-19

## 2025-03-19 RX ORDER — FUROSEMIDE 40 MG/1
40 TABLET ORAL DAILY
COMMUNITY
End: 2025-03-19

## 2025-03-19 RX ORDER — ESCITALOPRAM OXALATE 5 MG/1
5 TABLET ORAL DAILY
COMMUNITY
Start: 2025-02-27

## 2025-03-19 NOTE — PROGRESS NOTES
Normal LV size and systolic function.    4.     Mixed hyperlipidemia - labs 22: TC 97; TRIG 71; HDL 32; LDL 51  2024: TC: 159, TRI, HDL: 38, LDL: 97           Controlled on atorvastatin 40 mg nightly     5.     DVT - right popliteal DVT after right hip hemiarthroplasty . No longer taking Xarelto, pcp discontinued        Patient  is stable since hospital discharge.    Plan: D/C Lasix, continue Torsemide and Aldactone            Hold Klor Con- patient does not want to take the medication- recheck BMP and CBC in two weeks             Follow up in one month with Dr. Montemayor      I have addressed the patient's cardiac risk factors and adjusted pharmacologic treatment as needed. In addition, I have reinforced the need for patient directed risk factor modification.      Thank you for allowing to us to participate in the care of Kaylene Woods.      [facility] [place]   Selin Chairez CNP

## 2025-05-22 ENCOUNTER — TELEPHONE (OUTPATIENT)
Dept: CARDIOLOGY CLINIC | Age: 89
End: 2025-05-22

## 2025-05-22 NOTE — TELEPHONE ENCOUNTER
Home care states pt has increased swelling in legs and has had a 10 lb weight increase over 6 wk period. Pt has some Sob and tiredness. Pt's /74 HR 77. Please call to advise

## 2025-07-29 ENCOUNTER — TELEPHONE (OUTPATIENT)
Dept: CARDIOLOGY CLINIC | Age: 89
End: 2025-07-29

## 2025-07-29 NOTE — TELEPHONE ENCOUNTER
Per Dr Montemayor, pt to see [facility] to evaluate lymphedema. I spoke w/ Christiana and made an appt for 7/30/25 at 2pm. She is agreeable to appt date/time.

## 2025-07-29 NOTE — TELEPHONE ENCOUNTER
Pt daughter called stating patient was seen by PCP, Christiana recommended to the PCP she would like for the pt to see a [facility]hedema  [facility] , PCP stated that they recommended should come from the   Cardiologist office.    Christiana is requesting a referral     Pl called Christiana [phone]

## 2025-07-29 NOTE — PROGRESS NOTES
Mixed hyperlipidemia - labs 22: TC 97; TRIG 71; HDL 32; LDL 51  2024: TC: 159, TRI, HDL: 38, LDL: 97           Controlled on atorvastatin 40 mg nightly     5.     DVT - right popliteal DVT after right hip hemiarthroplasty . No longer taking Xarelto, pcp discontinued        Patient  is stable since last office visit    Plan: continue current medications           Encourage 2 gram sodium diet, elevate BLE , encourage exercise           Follow up in three months with Dr. Montemayor      I have addressed the patient's cardiac risk factors and adjusted pharmacologic treatment as needed. In addition, I have reinforced the need for patient directed risk factor modification.      Thank you for allowing to us to participate in the care of Kaylene Woods.      [facility] [place]   Selin Chairez CNP

## 2025-07-30 ENCOUNTER — OFFICE VISIT (OUTPATIENT)
Dept: CARDIOLOGY CLINIC | Age: 89
End: 2025-07-30
Payer: MEDICARE

## 2025-07-30 VITALS
OXYGEN SATURATION: 96 % | HEIGHT: 58 IN | BODY MASS INDEX: 26.2 KG/M2 | HEART RATE: 83 BPM | DIASTOLIC BLOOD PRESSURE: 64 MMHG | WEIGHT: 124.8 LBS | SYSTOLIC BLOOD PRESSURE: 100 MMHG

## 2025-07-30 DIAGNOSIS — I25.10 CORONARY ARTERY DISEASE INVOLVING NATIVE CORONARY ARTERY OF NATIVE HEART WITHOUT ANGINA PECTORIS: ICD-10-CM

## 2025-07-30 DIAGNOSIS — I82.4Z9 DEEP VEIN THROMBOSIS (DVT) OF DISTAL VEIN OF LOWER EXTREMITY, UNSPECIFIED CHRONICITY, UNSPECIFIED LATERALITY (HCC): Primary | ICD-10-CM

## 2025-07-30 DIAGNOSIS — R42 DIZZINESS: ICD-10-CM

## 2025-07-30 DIAGNOSIS — E78.2 MIXED HYPERLIPIDEMIA: ICD-10-CM

## 2025-07-30 PROCEDURE — 1036F TOBACCO NON-USER: CPT | Performed by: NURSE PRACTITIONER

## 2025-07-30 PROCEDURE — G8417 CALC BMI ABV UP PARAM F/U: HCPCS | Performed by: NURSE PRACTITIONER

## 2025-07-30 PROCEDURE — 93000 ELECTROCARDIOGRAM COMPLETE: CPT | Performed by: NURSE PRACTITIONER

## 2025-07-30 PROCEDURE — 99214 OFFICE O/P EST MOD 30 MIN: CPT | Performed by: NURSE PRACTITIONER

## 2025-07-30 PROCEDURE — 1123F ACP DISCUSS/DSCN MKR DOCD: CPT | Performed by: NURSE PRACTITIONER

## 2025-07-30 PROCEDURE — 1090F PRES/ABSN URINE INCON ASSESS: CPT | Performed by: NURSE PRACTITIONER

## 2025-07-30 PROCEDURE — G8427 DOCREV CUR MEDS BY ELIG CLIN: HCPCS | Performed by: NURSE PRACTITIONER
